# Patient Record
Sex: MALE | Race: WHITE | Employment: OTHER | URBAN - METROPOLITAN AREA
[De-identification: names, ages, dates, MRNs, and addresses within clinical notes are randomized per-mention and may not be internally consistent; named-entity substitution may affect disease eponyms.]

---

## 2017-01-06 ENCOUNTER — OFFICE VISIT (OUTPATIENT)
Dept: SURGERY | Facility: CLINIC | Age: 62
End: 2017-01-06

## 2017-01-06 DIAGNOSIS — Z87.19 S/P HEMORRHOIDECTOMY: Primary | ICD-10-CM

## 2017-01-06 DIAGNOSIS — Z98.890 S/P HEMORRHOIDECTOMY: Primary | ICD-10-CM

## 2017-01-06 PROCEDURE — 99213 OFFICE O/P EST LOW 20 MIN: CPT | Performed by: SURGERY

## 2017-01-06 PROCEDURE — 46600 DIAGNOSTIC ANOSCOPY SPX: CPT | Performed by: SURGERY

## 2017-01-08 NOTE — PROCEDURES
Patient in prone position. The anus and perineum were prepped and draped in the usual manner. Lidocaine 5% used as topical anesthesia. Anus was gently dilated and rectal digital exam performed .: no masses/pathology.    anoscope was advance in to the anal c

## 2017-01-08 NOTE — PATIENT INSTRUCTIONS
Anoscopy shows a well healed PPH Hemorrhoidectomy and RB of Hemorrhoid. Good rectal function. Patient will follow up with Primary care Physician. Call us as needed if there is a recurrence of the hemorrhoids issues.

## 2017-01-08 NOTE — PROGRESS NOTES
Follow Up Visit Note       Active Problems   S/p hemorrhoidectomy  (primary encounter diagnosis)  Chief Complaint: Patient presents with: Follow - Up: Patient here for follow up hemorrhoids. Last seen 07/08/2016, today is 6 month check up.  S/P Hemorrhoid METFORMIN  MG Oral Tab TAKE 2 TABLETS BY MOUTH TWICE DAILY WITH MEALS Disp: 360 tablet Rfl: 0   Losartan Potassium 50 MG Oral Tab Take 1 tablet (50 mg total) by mouth daily.  Disp: 90 tablet Rfl: 3   cetirizine 10 MG Oral Tab TAKE 1 TABLET(10 MG) B Rectum with well healed PPH hemorrhoidectomy . Musculoskeletal: Normal range of motion. He exhibits no edema or tenderness. Neurological: He is alert and oriented to person, place, and time. He has normal reflexes. Skin: Skin is warm and dry.  He is

## 2017-01-24 RX ORDER — PANTOPRAZOLE SODIUM 40 MG/1
TABLET, DELAYED RELEASE ORAL
Qty: 180 TABLET | Refills: 3 | Status: SHIPPED | OUTPATIENT
Start: 2017-01-24 | End: 2017-09-07

## 2017-01-24 NOTE — TELEPHONE ENCOUNTER
Pending Prescriptions Disp Refills    PANTOPRAZOLE SODIUM 40 MG Oral Tab EC [Pharmacy Med Name: PANTOPRAZOLE 40MG TABLETS] 180 tablet 0     Sig: TAKE ONE TABLET BY MOUTH TWICE DAILY BEFORE MEALS         See refill request  Patient last seen 12-8-16.    Me

## 2017-01-27 NOTE — TELEPHONE ENCOUNTER
please advise as does not meet RN protocol for refill criteria-labs      Cholesterol Medications  Protocol Criteria:  · Appointment scheduled in the past 12 months or in the next 3 months  · ALT & LDL on file in the past 12 months  · ALT result < 80  · LD

## 2017-01-29 RX ORDER — ATORVASTATIN CALCIUM 80 MG/1
TABLET, FILM COATED ORAL
Qty: 90 TABLET | Refills: 0 | Status: SHIPPED | OUTPATIENT
Start: 2017-01-29 | End: 2017-04-20

## 2017-02-06 ENCOUNTER — SURGERY (OUTPATIENT)
Age: 62
End: 2017-02-06

## 2017-02-07 ENCOUNTER — TELEPHONE (OUTPATIENT)
Dept: GASTROENTEROLOGY | Facility: CLINIC | Age: 62
End: 2017-02-07

## 2017-02-07 NOTE — TELEPHONE ENCOUNTER
Pt has questions about his 2/6/17 EGD. He wasn't able to speak to Gretta Linares after procedure. He would also like to know when he needs to be seen in office next. Please call.

## 2017-02-07 NOTE — TELEPHONE ENCOUNTER
NIRAVM for pt to call back to discuss his questions prior to sending to [address] [place] [address]. Dr Terry Sensor pt call back but phone lines down to receive calls for the remainder of the evening.

## 2017-02-08 NOTE — TELEPHONE ENCOUNTER
Spoke with pt and reviewed MD not in office this morning    He states he does not recall and his wife sometimes does not recall exactly what was said post procedure    He states he has been doing well on medication with no current symptoms.     Advised cont

## 2017-02-08 NOTE — TELEPHONE ENCOUNTER
Pt called in stating his pharmacy is requesting a new rx from Dr. Maritza Chance for his Invokana refill, please. Current outpatient prescriptions:     •  Canagliflozin (INVOKANA) 100 MG Oral Tab, Take 1 tablet by mouth daily. , Disp: 90 tablet, Rfl: 3

## 2017-02-09 NOTE — TELEPHONE ENCOUNTER
I discussed the findings of the patient's upper endoscopy (with him) retained food material in the stomach suggesting delayed gastric emptying. He is asymptomatic in this regard. There was no esophageal leukoplakia.   The patient also complains of urinary

## 2017-02-10 ENCOUNTER — PATIENT OUTREACH (OUTPATIENT)
Dept: INTERNAL MEDICINE CLINIC | Facility: CLINIC | Age: 62
End: 2017-02-10

## 2017-02-10 DIAGNOSIS — E11.9 TYPE 2 DIABETES MELLITUS WITHOUT COMPLICATION, WITHOUT LONG-TERM CURRENT USE OF INSULIN (HCC): Primary | ICD-10-CM

## 2017-02-10 DIAGNOSIS — Z12.5 PROSTATE CANCER SCREENING: ICD-10-CM

## 2017-02-10 NOTE — PROGRESS NOTES
Spoke with pt. Discussed that he is due for follow up labs to be completed. Pt agrees to do so. Instructed to fast prior to blood work. Pt states he also needs to schedule an appt with pcp but he is not able to do it at this time.  He will call back to sc

## 2017-02-11 NOTE — TELEPHONE ENCOUNTER
Please advise on refill request.  Unable to refill per protocol.     Diabetes Medications  Protocol Criteria:  · Appointment scheduled in the past 6 months or the next 3 months  · A1C < 7.5 in the past 6 months  · Creatinine in the past 12 months  · Creatin

## 2017-02-13 NOTE — PROGRESS NOTES
Pt called in requesting to speak to MILLY Nam. Pt states he didn't really have a good chance to talk to you and would like to speak with you today and re go over the plan. Please advise.

## 2017-02-13 NOTE — PROGRESS NOTES
Spoke with patient. He requested an appt with pcp to discuss urinary frequency that he has been having. Schedule for 2/21/17 at 12:50 pm.  Pt will pre lab prior to appt for diabetic labs. PLAN:  Follow up to review lab results.

## 2017-02-14 ENCOUNTER — APPOINTMENT (OUTPATIENT)
Dept: LAB | Facility: HOSPITAL | Age: 62
End: 2017-02-14
Attending: INTERNAL MEDICINE
Payer: COMMERCIAL

## 2017-02-14 DIAGNOSIS — E11.9 TYPE 2 DIABETES MELLITUS WITHOUT COMPLICATION, WITHOUT LONG-TERM CURRENT USE OF INSULIN (HCC): ICD-10-CM

## 2017-02-14 DIAGNOSIS — Z12.5 PROSTATE CANCER SCREENING: ICD-10-CM

## 2017-02-14 LAB
ALBUMIN SERPL BCP-MCNC: 4.8 G/DL (ref 3.5–4.8)
ALBUMIN/GLOB SERPL: 1.8 {RATIO} (ref 1–2)
ALP SERPL-CCNC: 53 U/L (ref 32–100)
ALT SERPL-CCNC: 47 U/L (ref 17–63)
ANION GAP SERPL CALC-SCNC: 10 MMOL/L (ref 0–18)
AST SERPL-CCNC: 37 U/L (ref 15–41)
BILIRUB SERPL-MCNC: 0.7 MG/DL (ref 0.3–1.2)
BUN SERPL-MCNC: 18 MG/DL (ref 8–20)
BUN/CREAT SERPL: 18.4 (ref 10–20)
CALCIUM SERPL-MCNC: 9.4 MG/DL (ref 8.5–10.5)
CHLORIDE SERPL-SCNC: 103 MMOL/L (ref 95–110)
CHOLEST SERPL-MCNC: 162 MG/DL (ref 110–200)
CO2 SERPL-SCNC: 26 MMOL/L (ref 22–32)
CREAT SERPL-MCNC: 0.98 MG/DL (ref 0.5–1.5)
CREAT UR-MCNC: 49.3 MG/DL
GLOBULIN PLAS-MCNC: 2.6 G/DL (ref 2.5–3.7)
GLUCOSE SERPL-MCNC: 114 MG/DL (ref 70–99)
HBA1C MFR BLD: 6.1 % (ref 4–6)
HDLC SERPL-MCNC: 26 MG/DL
LDLC SERPL CALC-MCNC: 80 MG/DL (ref 0–99)
MICROALBUMIN UR-MCNC: 2.1 MG/DL (ref 0–1.8)
MICROALBUMIN/CREAT UR: 42.6 MG/G{CREAT} (ref 0–20)
NONHDLC SERPL-MCNC: 136 MG/DL
OSMOLALITY UR CALC.SUM OF ELEC: 291 MOSM/KG (ref 275–295)
POTASSIUM SERPL-SCNC: 4.2 MMOL/L (ref 3.3–5.1)
PROT SERPL-MCNC: 7.4 G/DL (ref 5.9–8.4)
PSA SERPL-MCNC: 0.4 NG/ML (ref 0–4)
SODIUM SERPL-SCNC: 139 MMOL/L (ref 136–144)
TRIGL SERPL-MCNC: 280 MG/DL (ref 1–149)
TSH SERPL-ACNC: 0.96 UIU/ML (ref 0.34–5.6)

## 2017-02-14 PROCEDURE — 82570 ASSAY OF URINE CREATININE: CPT

## 2017-02-14 PROCEDURE — 36415 COLL VENOUS BLD VENIPUNCTURE: CPT

## 2017-02-14 PROCEDURE — 80061 LIPID PANEL: CPT

## 2017-02-14 PROCEDURE — 84443 ASSAY THYROID STIM HORMONE: CPT

## 2017-02-14 PROCEDURE — 83036 HEMOGLOBIN GLYCOSYLATED A1C: CPT

## 2017-02-14 PROCEDURE — 82043 UR ALBUMIN QUANTITATIVE: CPT

## 2017-02-14 PROCEDURE — 80053 COMPREHEN METABOLIC PANEL: CPT

## 2017-02-14 RX ORDER — DICYCLOMINE HYDROCHLORIDE 10 MG/1
CAPSULE ORAL
Qty: 120 CAPSULE | Refills: 11 | Status: SHIPPED | OUTPATIENT
Start: 2017-02-14 | End: 2017-07-27

## 2017-02-14 RX ORDER — GABAPENTIN 300 MG/1
CAPSULE ORAL
Qty: 180 CAPSULE | Refills: 0 | Status: SHIPPED | OUTPATIENT
Start: 2017-02-14 | End: 2017-05-09

## 2017-02-14 RX ORDER — FAMOTIDINE 40 MG/1
TABLET, FILM COATED ORAL
Qty: 90 TABLET | Refills: 0 | Status: SHIPPED | OUTPATIENT
Start: 2017-02-14 | End: 2017-05-28

## 2017-02-14 NOTE — TELEPHONE ENCOUNTER
Dr. Schmitt Patch see refill request for Dicyclomine. I phoned pt as it was LF: 11.14.16 (#120 R:2) and Rx is 4 times daily as needed. Pt states he typically takes 4 times daily, but at times down to 2 times. States he does need refill.    There is already on

## 2017-02-16 RX ORDER — BLOOD SUGAR DIAGNOSTIC
STRIP MISCELLANEOUS
Qty: 200 STRIP | Refills: 0 | Status: SHIPPED | OUTPATIENT
Start: 2017-02-16

## 2017-02-16 RX ORDER — LANCETS
EACH MISCELLANEOUS
Qty: 102 EACH | Refills: 2 | Status: SHIPPED | OUTPATIENT
Start: 2017-02-16

## 2017-02-21 ENCOUNTER — OFFICE VISIT (OUTPATIENT)
Dept: INTERNAL MEDICINE CLINIC | Facility: CLINIC | Age: 62
End: 2017-02-21

## 2017-02-21 VITALS
WEIGHT: 210 LBS | DIASTOLIC BLOOD PRESSURE: 86 MMHG | BODY MASS INDEX: 31.46 KG/M2 | HEART RATE: 84 BPM | SYSTOLIC BLOOD PRESSURE: 144 MMHG | HEIGHT: 68.5 IN

## 2017-02-21 DIAGNOSIS — E11.9 TYPE 2 DIABETES MELLITUS WITHOUT COMPLICATION, UNSPECIFIED LONG TERM INSULIN USE STATUS: ICD-10-CM

## 2017-02-21 DIAGNOSIS — I10 ESSENTIAL HYPERTENSION: ICD-10-CM

## 2017-02-21 DIAGNOSIS — K13.21 LEUKOPLAKIA OF ORAL CAVITY: ICD-10-CM

## 2017-02-21 DIAGNOSIS — N40.1 BENIGN NODULAR PROSTATIC HYPERPLASIA WITH LOWER URINARY TRACT SYMPTOMS: Primary | ICD-10-CM

## 2017-02-21 PROCEDURE — 99212 OFFICE O/P EST SF 10 MIN: CPT | Performed by: INTERNAL MEDICINE

## 2017-02-21 PROCEDURE — 99213 OFFICE O/P EST LOW 20 MIN: CPT | Performed by: INTERNAL MEDICINE

## 2017-02-21 RX ORDER — TERAZOSIN 5 MG/1
5 CAPSULE ORAL NIGHTLY
Qty: 30 CAPSULE | Refills: 3 | Status: SHIPPED | OUTPATIENT
Start: 2017-02-21 | End: 2017-06-02

## 2017-02-21 RX ORDER — FINASTERIDE 5 MG/1
5 TABLET, FILM COATED ORAL DAILY
Qty: 30 TABLET | Refills: 3 | Status: SHIPPED | OUTPATIENT
Start: 2017-02-21 | End: 2017-06-02

## 2017-02-21 NOTE — PROGRESS NOTES
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     HEENT-NC/AT PEERLA, eom intact, sclerae non icteric, oral exam wnl

## 2017-02-22 ENCOUNTER — TELEPHONE (OUTPATIENT)
Dept: INTERNAL MEDICINE CLINIC | Facility: CLINIC | Age: 62
End: 2017-02-22

## 2017-02-24 NOTE — TELEPHONE ENCOUNTER
PA for Icosapent Ethyl (VASCEPA) 1gm cap  completed with [employer] via CMM response time 3-5 business days Rafael Salazar.

## 2017-03-02 ENCOUNTER — TELEPHONE (OUTPATIENT)
Dept: INTERNAL MEDICINE CLINIC | Facility: CLINIC | Age: 62
End: 2017-03-02

## 2017-03-02 NOTE — TELEPHONE ENCOUNTER
Vascepa denied. Plan states use formulary alternatives Fenofibric acid, Fenofibrate and Gemfibrozil.

## 2017-03-21 ENCOUNTER — OFFICE VISIT (OUTPATIENT)
Dept: INTERNAL MEDICINE CLINIC | Facility: CLINIC | Age: 62
End: 2017-03-21

## 2017-03-21 VITALS
DIASTOLIC BLOOD PRESSURE: 77 MMHG | BODY MASS INDEX: 32.81 KG/M2 | HEIGHT: 68.5 IN | WEIGHT: 219 LBS | SYSTOLIC BLOOD PRESSURE: 136 MMHG | HEART RATE: 74 BPM

## 2017-03-21 DIAGNOSIS — E11.9 TYPE 2 DIABETES MELLITUS WITHOUT COMPLICATION, UNSPECIFIED LONG TERM INSULIN USE STATUS: ICD-10-CM

## 2017-03-21 DIAGNOSIS — R35.0 URINARY FREQUENCY: Primary | ICD-10-CM

## 2017-03-21 PROCEDURE — 99212 OFFICE O/P EST SF 10 MIN: CPT | Performed by: INTERNAL MEDICINE

## 2017-03-21 PROCEDURE — 99213 OFFICE O/P EST LOW 20 MIN: CPT | Performed by: INTERNAL MEDICINE

## 2017-03-21 NOTE — PROGRESS NOTES
Still urinating frequently  Blood sugars are still in the  range without invokana  Blood pressure 136/77, pulse 74, height 5' 8.5\" (1.74 m), weight 219 lb (99.338 kg).     HEENT-NC/AT PEERLA, eom intact, sclerae non icteric, oral exam wnl, ears nl tm

## 2017-04-12 ENCOUNTER — OFFICE VISIT (OUTPATIENT)
Dept: SURGERY | Facility: CLINIC | Age: 62
End: 2017-04-12

## 2017-04-12 VITALS
DIASTOLIC BLOOD PRESSURE: 66 MMHG | HEIGHT: 69 IN | SYSTOLIC BLOOD PRESSURE: 130 MMHG | BODY MASS INDEX: 29.62 KG/M2 | RESPIRATION RATE: 16 BRPM | WEIGHT: 200 LBS | HEART RATE: 84 BPM | TEMPERATURE: 98 F

## 2017-04-12 DIAGNOSIS — R82.90 URINE FINDING: ICD-10-CM

## 2017-04-12 DIAGNOSIS — R35.0 URINARY FREQUENCY: Primary | ICD-10-CM

## 2017-04-12 DIAGNOSIS — R82.90 URINE ABNORMALITY: ICD-10-CM

## 2017-04-12 PROCEDURE — 99213 OFFICE O/P EST LOW 20 MIN: CPT | Performed by: UROLOGY

## 2017-04-12 PROCEDURE — 99244 OFF/OP CNSLTJ NEW/EST MOD 40: CPT | Performed by: UROLOGY

## 2017-04-12 PROCEDURE — 81003 URINALYSIS AUTO W/O SCOPE: CPT | Performed by: UROLOGY

## 2017-04-12 NOTE — PROGRESS NOTES
SUBJECTIVE:  Agustina Guevara is a [address] old male who presents for a consultation at the request of, and a copy of this note will be sent to, Dr. Jamie Newman, for evaluation of  benign prostatic hyperplasia and urinary frquency.  He states that the problem i apnea    • High cholesterol    • Osteoarthritis    • History of stomach ulcers           Past Surgical History    HEMORRHOIDECTOMY  06/24/14    Comment Hemorrhoidectomy and rectal mucosqal proctopexy for correction of a significant rectal mucosal prolapse discomfort, dizziness or fainting, palpitations, leg swelling, nocturia, or claudication. GASTROINTESTINAL:  Negative for nausea, vomiting, diarrhea, constipation, heartburn or indigestion, abdominal pains, bloody or tarry stools.   GENERAL: Denies:  weigh

## 2017-04-21 RX ORDER — ATORVASTATIN CALCIUM 80 MG/1
TABLET, FILM COATED ORAL
Qty: 90 TABLET | Refills: 3 | Status: SHIPPED | OUTPATIENT
Start: 2017-04-21 | End: 2017-07-13

## 2017-05-03 ENCOUNTER — TELEPHONE (OUTPATIENT)
Dept: GASTROENTEROLOGY | Facility: CLINIC | Age: 62
End: 2017-05-03

## 2017-05-03 DIAGNOSIS — K75.81 NONALCOHOLIC STEATOHEPATITIS (NASH): Primary | ICD-10-CM

## 2017-05-10 RX ORDER — GABAPENTIN 300 MG/1
CAPSULE ORAL
Qty: 180 CAPSULE | Refills: 11 | Status: SHIPPED | OUTPATIENT
Start: 2017-05-10 | End: 2017-05-15

## 2017-05-11 ENCOUNTER — OFFICE VISIT (OUTPATIENT)
Dept: SURGERY | Facility: CLINIC | Age: 62
End: 2017-05-11

## 2017-05-11 DIAGNOSIS — R35.0 URINARY FREQUENCY: Primary | ICD-10-CM

## 2017-05-11 PROCEDURE — 51798 US URINE CAPACITY MEASURE: CPT | Performed by: UROLOGY

## 2017-05-11 PROCEDURE — 51741 ELECTRO-UROFLOWMETRY FIRST: CPT | Performed by: UROLOGY

## 2017-05-11 PROCEDURE — 99213 OFFICE O/P EST LOW 20 MIN: CPT | Performed by: UROLOGY

## 2017-05-11 RX ORDER — FENOFIBRATE 160 MG/1
TABLET ORAL
Qty: 90 TABLET | Refills: 0 | Status: SHIPPED | OUTPATIENT
Start: 2017-05-11 | End: 2017-07-27

## 2017-05-11 RX ORDER — OXYBUTYNIN CHLORIDE 5 MG/1
5 TABLET, EXTENDED RELEASE ORAL DAILY
Qty: 30 TABLET | Refills: 11 | Status: SHIPPED | OUTPATIENT
Start: 2017-05-11 | End: 2017-07-27

## 2017-05-11 NOTE — TELEPHONE ENCOUNTER
Hypertensive Medications  Protocol Criteria:  · Appointment scheduled in the past 6 months or in the next 3 months  · BMP or CMP in the past 12 months  · Creatinine result < 2      LAST OV 3.2017    Future Appointments       Provider Department Appt Notes

## 2017-05-11 NOTE — PROGRESS NOTES
Jim Sorensen is a 63year old male. HPI:   Patient presents with:  Urinary Symptoms (urologic): uroflow     63-year-old male presents for uroflow bladder ultrasound referred for evaluation of urinary frequency urgency and nocturia ×2-3.   He has been Onset   • Heart Surgery Father      CABG   • Diabetes Father 64     type 1 (cause of death)   • Hypertension Father    • Lipids Father      hyperlipidemia   • Heart Disease Father      CAD   • Diabetes Mother    • Hypertension Mother    • Lipids Mother BY MOUTH TWICE DAILY BEFORE MEALS Disp: 180 tablet Rfl: 3   METFORMIN  MG Oral Tab TAKE 2 TABLETS BY MOUTH TWICE DAILY WITH MEALS Disp: 360 tablet Rfl: 0   Losartan Potassium 50 MG Oral Tab Take 1 tablet (50 mg total) by mouth daily.  Disp: 90 tablet provided. I asked the patient to follow-up in 4 weeks to assess clinical improvement. Patient understands plan and agrees and will follow up accordingly. Orders This Visit:  No orders of the defined types were placed in this encounter.

## 2017-05-15 RX ORDER — GABAPENTIN 300 MG/1
CAPSULE ORAL
Qty: 180 CAPSULE | Refills: 11 | Status: SHIPPED | OUTPATIENT
Start: 2017-05-15 | End: 2018-05-25

## 2017-05-16 ENCOUNTER — TELEPHONE (OUTPATIENT)
Dept: INTERNAL MEDICINE CLINIC | Facility: CLINIC | Age: 62
End: 2017-05-16

## 2017-05-16 NOTE — TELEPHONE ENCOUNTER
Pt calling in for a refill rx.  for med below  Pt has about a weeks worth of meds left    cetirizine 10 MG Oral Tab 90 tablet 3 5/23/2016       Sig :  TAKE 1 TABLET(10 MG) BY MOUTH NIGHTLY       Route:   (none)       Order #:   [id number]

## 2017-05-19 RX ORDER — CETIRIZINE HYDROCHLORIDE 10 MG/1
TABLET ORAL
Qty: 90 TABLET | Refills: 0 | Status: SHIPPED | OUTPATIENT
Start: 2017-05-19 | End: 2017-08-11

## 2017-05-29 RX ORDER — FAMOTIDINE 40 MG/1
TABLET, FILM COATED ORAL
Qty: 90 TABLET | Refills: 0 | Status: SHIPPED | OUTPATIENT
Start: 2017-05-29 | End: 2017-08-25

## 2017-05-30 NOTE — TELEPHONE ENCOUNTER
Chart reviewed. Refills sent per Triage Dept protocol.    Refill Protocol Appointment Criteria  · Appointment scheduled in the past 12 months or in the next 3 months  Recent Visits       Provider Department Primary Dx    2 months ago Marva Solomon MD E

## 2017-06-01 ENCOUNTER — HOSPITAL ENCOUNTER (OUTPATIENT)
Dept: ULTRASOUND IMAGING | Facility: HOSPITAL | Age: 62
Discharge: HOME | End: 2017-06-01
Attending: INTERNAL MEDICINE
Payer: COMMERCIAL

## 2017-06-01 DIAGNOSIS — K75.81 NONALCOHOLIC STEATOHEPATITIS (NASH): ICD-10-CM

## 2017-06-01 PROCEDURE — 76705 ECHO EXAM OF ABDOMEN: CPT | Performed by: INTERNAL MEDICINE

## 2017-06-03 RX ORDER — FINASTERIDE 5 MG/1
TABLET, FILM COATED ORAL
Qty: 30 TABLET | Refills: 11 | Status: SHIPPED | OUTPATIENT
Start: 2017-06-03 | End: 2017-07-13

## 2017-06-03 RX ORDER — TERAZOSIN 5 MG/1
CAPSULE ORAL
Qty: 30 CAPSULE | Refills: 11 | Status: SHIPPED | OUTPATIENT
Start: 2017-06-03 | End: 2017-07-13

## 2017-06-15 ENCOUNTER — TELEPHONE (OUTPATIENT)
Dept: SURGERY | Facility: CLINIC | Age: 62
End: 2017-06-15

## 2017-06-15 ENCOUNTER — OFFICE VISIT (OUTPATIENT)
Dept: SURGERY | Facility: CLINIC | Age: 62
End: 2017-06-15

## 2017-06-15 VITALS
SYSTOLIC BLOOD PRESSURE: 146 MMHG | HEART RATE: 75 BPM | TEMPERATURE: 98 F | WEIGHT: 205 LBS | DIASTOLIC BLOOD PRESSURE: 83 MMHG | BODY MASS INDEX: 28.7 KG/M2 | HEIGHT: 70.8 IN

## 2017-06-15 DIAGNOSIS — R35.0 URINARY FREQUENCY: Primary | ICD-10-CM

## 2017-06-15 PROCEDURE — 99212 OFFICE O/P EST SF 10 MIN: CPT | Performed by: UROLOGY

## 2017-06-15 PROCEDURE — 99213 OFFICE O/P EST LOW 20 MIN: CPT | Performed by: UROLOGY

## 2017-06-15 NOTE — PROGRESS NOTES
Dimitry Mazariegos is a 63year old male.     HPI:   Patient presents with:  Urinary Symptoms (urologic): frequency every half hour during the day no nocturia denies hematuria or dysuria   Incontinence: patient wearing incontinence pads       72-year-old male Right 2/3/2011    UPPER GI ENDOSCOPY,EXAM  2011    Comment EGD with biopsy    COLONOSCOPY  2010,1011    CARPAL TUNNEL RELEASE Right 6/3/2014    KNEE ARTHROSCOPY Right [id number]    T&A  1994    REPAIR OF NASAL SEPTUM  1994    Comment nasal septoplasty 160 MG Oral Tab TAKE 1 TABLET(160 MG) BY MOUTH EVERY DAY Disp: 90 tablet Rfl: 0   Oxybutynin Chloride ER 5 MG Oral Tablet 24 Hr Take 1 tablet (5 mg total) by mouth daily.  Disp: 30 tablet Rfl: 11   ATORVASTATIN CALCIUM 80 MG Oral Tab TAKE 1 TABLET BY MOUTH ASSESSMENT/PLAN:   Assessment  Urinary frequency  (primary encounter diagnosis)    Reviewed findings at length with patient. Recommended further evaluation with urodynamic studies to rule out diabetes mediated changes in his bladder function.   The patie

## 2017-06-28 ENCOUNTER — OFFICE VISIT (OUTPATIENT)
Dept: SURGERY | Facility: CLINIC | Age: 62
End: 2017-06-28

## 2017-06-28 DIAGNOSIS — R35.0 URINARY FREQUENCY: Primary | ICD-10-CM

## 2017-06-28 DIAGNOSIS — R39.9 SYMPTOM INVOLVING BLADDER: ICD-10-CM

## 2017-06-28 DIAGNOSIS — Z12.5 SPECIAL SCREENING, PROSTATE CANCER: ICD-10-CM

## 2017-06-28 DIAGNOSIS — Z01.818 PREOP EXAMINATION: ICD-10-CM

## 2017-06-28 PROCEDURE — 99214 OFFICE O/P EST MOD 30 MIN: CPT | Performed by: UROLOGY

## 2017-06-28 PROCEDURE — 51784 ANAL/URINARY MUSCLE STUDY: CPT | Performed by: UROLOGY

## 2017-06-28 PROCEDURE — 51728 CYSTOMETROGRAM W/VP: CPT | Performed by: UROLOGY

## 2017-06-28 PROCEDURE — 51797 INTRAABDOMINAL PRESSURE TEST: CPT | Performed by: UROLOGY

## 2017-06-28 NOTE — PROGRESS NOTES
Sharath Vazquez is a 63year old male. HPI:   No chief complaint on file. 53-year-old male presents for urodynamic testing and follow-up to a previous visits Thuy 15, 2017.   The patient has BPH, lower urinary tract symptoms refractory to medical the Mother    • Lipids Mother      hyperlipidemia   • Thyroid Disorder Mother      Thyroid disease   • Stroke Mother    • Diabetes Sister    • Stroke Brother      CVA   • Diabetes Brother    • Hypertension Brother    • Diabetes Other      type 1, fam hx   • Gl tablet Rfl: 0   Losartan Potassium 50 MG Oral Tab Take 1 tablet (50 mg total) by mouth daily. Disp: 90 tablet Rfl: 3   Dermatological Products, Misc.  (RESINOL) 55-2 % Apply Externally Ointment Apply to rectal area as directed per MD. Disp: 1 each Rfl: 3 frequency  (primary encounter diagnosis)    Reviewed all issues with patient. Recommended cystoscopy and greenlight laser vaporization of the prostate.   Discussed with him events of the [facility] today and told the patient that if a urethral stricture or bladde

## 2017-06-29 ENCOUNTER — TELEPHONE (OUTPATIENT)
Dept: SURGERY | Facility: CLINIC | Age: 62
End: 2017-06-29

## 2017-06-29 DIAGNOSIS — N40.1 HYPERPLASIA OF PROSTATE WITH LOWER URINARY TRACT SYMPTOMS (LUTS): Primary | ICD-10-CM

## 2017-06-29 NOTE — TELEPHONE ENCOUNTER
Patient seen in office scheduled for cysto, green light laser on 07/11/17 @ 2:00 at [facility]/outpatient

## 2017-06-30 NOTE — TELEPHONE ENCOUNTER
Pt is having Prostate surgery on 07/11/17 @ [facility]. He will have his blood drawn with his pre op labs. Reminded pt he needs to be fasting.  He verbalizes understanding

## 2017-07-01 ENCOUNTER — APPOINTMENT (OUTPATIENT)
Dept: LAB | Facility: HOSPITAL | Age: 62
End: 2017-07-01
Attending: INTERNAL MEDICINE
Payer: COMMERCIAL

## 2017-07-01 DIAGNOSIS — Z12.5 SPECIAL SCREENING, PROSTATE CANCER: ICD-10-CM

## 2017-07-01 DIAGNOSIS — K75.81 NONALCOHOLIC STEATOHEPATITIS (NASH): ICD-10-CM

## 2017-07-01 DIAGNOSIS — Z01.818 PREOP EXAMINATION: ICD-10-CM

## 2017-07-01 DIAGNOSIS — R39.9 SYMPTOM INVOLVING BLADDER: ICD-10-CM

## 2017-07-01 LAB
ALBUMIN SERPL BCP-MCNC: 4.5 G/DL (ref 3.5–4.8)
ALBUMIN/GLOB SERPL: 2 {RATIO} (ref 1–2)
ALP SERPL-CCNC: 56 U/L (ref 32–100)
ALT SERPL-CCNC: 68 U/L (ref 17–63)
ANION GAP SERPL CALC-SCNC: 10 MMOL/L (ref 0–18)
AST SERPL-CCNC: 50 U/L (ref 15–41)
BASOPHILS # BLD: 0 K/UL (ref 0–0.2)
BASOPHILS NFR BLD: 1 %
BILIRUB SERPL-MCNC: 0.5 MG/DL (ref 0.3–1.2)
BUN SERPL-MCNC: 19 MG/DL (ref 8–20)
BUN/CREAT SERPL: 17.3 (ref 10–20)
CALCIUM SERPL-MCNC: 9.1 MG/DL (ref 8.5–10.5)
CHLORIDE SERPL-SCNC: 107 MMOL/L (ref 95–110)
CO2 SERPL-SCNC: 25 MMOL/L (ref 22–32)
CREAT SERPL-MCNC: 1.1 MG/DL (ref 0.5–1.5)
EOSINOPHIL # BLD: 0.1 K/UL (ref 0–0.7)
EOSINOPHIL NFR BLD: 3 %
ERYTHROCYTE [DISTWIDTH] IN BLOOD BY AUTOMATED COUNT: 13.6 % (ref 11–15)
GLOBULIN PLAS-MCNC: 2.3 G/DL (ref 2.5–3.7)
GLUCOSE SERPL-MCNC: 132 MG/DL (ref 70–99)
HCT VFR BLD AUTO: 40.4 % (ref 41–52)
HGB BLD-MCNC: 14.2 G/DL (ref 13.5–17.5)
INR BLD: 1 (ref 0.9–1.2)
LYMPHOCYTES # BLD: 1.2 K/UL (ref 1–4)
LYMPHOCYTES NFR BLD: 30 %
MCH RBC QN AUTO: 32.5 PG (ref 27–32)
MCHC RBC AUTO-ENTMCNC: 35.1 G/DL (ref 32–37)
MCV RBC AUTO: 92.6 FL (ref 80–100)
MONOCYTES # BLD: 0.5 K/UL (ref 0–1)
MONOCYTES NFR BLD: 13 %
NEUTROPHILS # BLD AUTO: 2.2 K/UL (ref 1.8–7.7)
NEUTROPHILS NFR BLD: 54 %
OSMOLALITY UR CALC.SUM OF ELEC: 298 MOSM/KG (ref 275–295)
PLATELET # BLD AUTO: 156 K/UL (ref 140–400)
PMV BLD AUTO: 9.6 FL (ref 7.4–10.3)
POTASSIUM SERPL-SCNC: 4 MMOL/L (ref 3.3–5.1)
PROT SERPL-MCNC: 6.8 G/DL (ref 5.9–8.4)
PROTHROMBIN TIME: 13 SECONDS (ref 11.8–14.5)
PSA SERPL-MCNC: 0.2 NG/ML (ref 0–4)
RBC # BLD AUTO: 4.36 M/UL (ref 4.5–5.9)
SODIUM SERPL-SCNC: 142 MMOL/L (ref 136–144)
WBC # BLD AUTO: 4 K/UL (ref 4–11)

## 2017-07-01 PROCEDURE — 93005 ELECTROCARDIOGRAM TRACING: CPT

## 2017-07-01 PROCEDURE — 36415 COLL VENOUS BLD VENIPUNCTURE: CPT

## 2017-07-01 PROCEDURE — 80053 COMPREHEN METABOLIC PANEL: CPT

## 2017-07-01 PROCEDURE — 85610 PROTHROMBIN TIME: CPT

## 2017-07-01 PROCEDURE — 82105 ALPHA-FETOPROTEIN SERUM: CPT

## 2017-07-01 PROCEDURE — 87086 URINE CULTURE/COLONY COUNT: CPT

## 2017-07-01 PROCEDURE — 93010 ELECTROCARDIOGRAM REPORT: CPT | Performed by: UROLOGY

## 2017-07-01 PROCEDURE — 85025 COMPLETE CBC W/AUTO DIFF WBC: CPT | Performed by: INTERNAL MEDICINE

## 2017-07-03 LAB — AFP-TM SERPL-MCNC: 5.4 NG/ML (ref 0–8.9)

## 2017-07-06 ENCOUNTER — TELEPHONE (OUTPATIENT)
Dept: SURGERY | Facility: CLINIC | Age: 62
End: 2017-07-06

## 2017-07-06 RX ORDER — GABAPENTIN 600 MG/1
600 TABLET ORAL NIGHTLY
COMMUNITY
End: 2017-07-13

## 2017-07-06 RX ORDER — MAG HYDROX/ALUMINUM HYD/SIMETH 400-400-40
SUSPENSION, ORAL (FINAL DOSE FORM) ORAL
COMMUNITY

## 2017-07-06 NOTE — TELEPHONE ENCOUNTER
Dear Laura March, per [facility]' request, your patient is scheduled for Cystoscopy, with Green light Laser Vaproization on Tuesday 07/11/17 and [facility] is asking that patient be medically cleared for the procedure.   Any questions please contact [facility]

## 2017-07-11 ENCOUNTER — SURGERY (OUTPATIENT)
Age: 62
End: 2017-07-11

## 2017-07-11 ENCOUNTER — HOSPITAL ENCOUNTER (OUTPATIENT)
Facility: HOSPITAL | Age: 62
Setting detail: HOSPITAL OUTPATIENT SURGERY
Discharge: HOME | End: 2017-07-11
Attending: UROLOGY | Admitting: UROLOGY
Payer: COMMERCIAL

## 2017-07-11 ENCOUNTER — ANESTHESIA EVENT (OUTPATIENT)
Dept: SURGERY | Facility: HOSPITAL | Age: 62
End: 2017-07-11
Payer: COMMERCIAL

## 2017-07-11 ENCOUNTER — ANESTHESIA (OUTPATIENT)
Dept: SURGERY | Facility: HOSPITAL | Age: 62
End: 2017-07-11
Payer: COMMERCIAL

## 2017-07-11 VITALS
OXYGEN SATURATION: 95 % | TEMPERATURE: 97 F | RESPIRATION RATE: 16 BRPM | BODY MASS INDEX: 30.81 KG/M2 | HEIGHT: 69 IN | DIASTOLIC BLOOD PRESSURE: 80 MMHG | HEART RATE: 64 BPM | WEIGHT: 208 LBS | SYSTOLIC BLOOD PRESSURE: 133 MMHG

## 2017-07-11 LAB — GLUCOSE BLDC GLUCOMTR-MCNC: 106 MG/DL (ref 70–99)

## 2017-07-11 PROCEDURE — 0V508ZZ DESTRUCTION OF PROSTATE, VIA NATURAL OR ARTIFICIAL OPENING ENDOSCOPIC: ICD-10-PCS | Performed by: UROLOGY

## 2017-07-11 PROCEDURE — 52648 LASER SURGERY OF PROSTATE: CPT | Performed by: UROLOGY

## 2017-07-11 RX ORDER — NALOXONE HYDROCHLORIDE 0.4 MG/ML
80 INJECTION, SOLUTION INTRAMUSCULAR; INTRAVENOUS; SUBCUTANEOUS AS NEEDED
Status: DISCONTINUED | OUTPATIENT
Start: 2017-07-11 | End: 2017-07-11

## 2017-07-11 RX ORDER — HYDROCODONE BITARTRATE AND ACETAMINOPHEN 5; 325 MG/1; MG/1
2 TABLET ORAL AS NEEDED
Status: COMPLETED | OUTPATIENT
Start: 2017-07-11 | End: 2017-07-11

## 2017-07-11 RX ORDER — HYDROMORPHONE HYDROCHLORIDE 1 MG/ML
0.4 INJECTION, SOLUTION INTRAMUSCULAR; INTRAVENOUS; SUBCUTANEOUS EVERY 5 MIN PRN
Status: DISCONTINUED | OUTPATIENT
Start: 2017-07-11 | End: 2017-07-11

## 2017-07-11 RX ORDER — LIDOCAINE HYDROCHLORIDE 10 MG/ML
INJECTION, SOLUTION EPIDURAL; INFILTRATION; INTRACAUDAL; PERINEURAL AS NEEDED
Status: DISCONTINUED | OUTPATIENT
Start: 2017-07-11 | End: 2017-07-11 | Stop reason: SURG

## 2017-07-11 RX ORDER — MORPHINE SULFATE 4 MG/ML
6 INJECTION, SOLUTION INTRAMUSCULAR; INTRAVENOUS EVERY 10 MIN PRN
Status: DISCONTINUED | OUTPATIENT
Start: 2017-07-11 | End: 2017-07-11

## 2017-07-11 RX ORDER — HYDROMORPHONE HYDROCHLORIDE 1 MG/ML
0.6 INJECTION, SOLUTION INTRAMUSCULAR; INTRAVENOUS; SUBCUTANEOUS EVERY 5 MIN PRN
Status: DISCONTINUED | OUTPATIENT
Start: 2017-07-11 | End: 2017-07-11

## 2017-07-11 RX ORDER — HYDROMORPHONE HYDROCHLORIDE 1 MG/ML
0.2 INJECTION, SOLUTION INTRAMUSCULAR; INTRAVENOUS; SUBCUTANEOUS EVERY 5 MIN PRN
Status: DISCONTINUED | OUTPATIENT
Start: 2017-07-11 | End: 2017-07-11

## 2017-07-11 RX ORDER — SODIUM CHLORIDE, SODIUM LACTATE, POTASSIUM CHLORIDE, CALCIUM CHLORIDE 600; 310; 30; 20 MG/100ML; MG/100ML; MG/100ML; MG/100ML
INJECTION, SOLUTION INTRAVENOUS CONTINUOUS PRN
Status: DISCONTINUED | OUTPATIENT
Start: 2017-07-11 | End: 2017-07-11 | Stop reason: SURG

## 2017-07-11 RX ORDER — FAMOTIDINE 20 MG/1
20 TABLET ORAL ONCE
Status: DISCONTINUED | OUTPATIENT
Start: 2017-07-11 | End: 2017-07-11 | Stop reason: HOSPADM

## 2017-07-11 RX ORDER — MIDAZOLAM HYDROCHLORIDE 1 MG/ML
INJECTION INTRAMUSCULAR; INTRAVENOUS AS NEEDED
Status: DISCONTINUED | OUTPATIENT
Start: 2017-07-11 | End: 2017-07-11 | Stop reason: SURG

## 2017-07-11 RX ORDER — MORPHINE SULFATE 4 MG/ML
4 INJECTION, SOLUTION INTRAMUSCULAR; INTRAVENOUS EVERY 10 MIN PRN
Status: DISCONTINUED | OUTPATIENT
Start: 2017-07-11 | End: 2017-07-11

## 2017-07-11 RX ORDER — SODIUM CHLORIDE, SODIUM LACTATE, POTASSIUM CHLORIDE, CALCIUM CHLORIDE 600; 310; 30; 20 MG/100ML; MG/100ML; MG/100ML; MG/100ML
INJECTION, SOLUTION INTRAVENOUS CONTINUOUS
Status: DISCONTINUED | OUTPATIENT
Start: 2017-07-11 | End: 2017-07-11

## 2017-07-11 RX ORDER — METOCLOPRAMIDE 10 MG/1
10 TABLET ORAL ONCE
Status: COMPLETED | OUTPATIENT
Start: 2017-07-11 | End: 2017-07-11

## 2017-07-11 RX ORDER — ACETAMINOPHEN 325 MG/1
650 TABLET ORAL ONCE
Status: COMPLETED | OUTPATIENT
Start: 2017-07-11 | End: 2017-07-11

## 2017-07-11 RX ORDER — HYDROCODONE BITARTRATE AND ACETAMINOPHEN 5; 325 MG/1; MG/1
1 TABLET ORAL AS NEEDED
Status: COMPLETED | OUTPATIENT
Start: 2017-07-11 | End: 2017-07-11

## 2017-07-11 RX ORDER — MORPHINE SULFATE 2 MG/ML
2 INJECTION, SOLUTION INTRAMUSCULAR; INTRAVENOUS EVERY 10 MIN PRN
Status: DISCONTINUED | OUTPATIENT
Start: 2017-07-11 | End: 2017-07-11

## 2017-07-11 RX ORDER — CIPROFLOXACIN 500 MG/1
500 TABLET, FILM COATED ORAL 2 TIMES DAILY
Qty: 6 TABLET | Refills: 0 | Status: SHIPPED | OUTPATIENT
Start: 2017-07-12 | End: 2017-07-27

## 2017-07-11 RX ORDER — ONDANSETRON 2 MG/ML
4 INJECTION INTRAMUSCULAR; INTRAVENOUS ONCE AS NEEDED
Status: DISCONTINUED | OUTPATIENT
Start: 2017-07-11 | End: 2017-07-11

## 2017-07-11 RX ORDER — LEVOFLOXACIN 5 MG/ML
500 INJECTION, SOLUTION INTRAVENOUS ONCE
Status: COMPLETED | OUTPATIENT
Start: 2017-07-11 | End: 2017-07-11

## 2017-07-11 RX ADMIN — SODIUM CHLORIDE, SODIUM LACTATE, POTASSIUM CHLORIDE, CALCIUM CHLORIDE: 600; 310; 30; 20 INJECTION, SOLUTION INTRAVENOUS at 15:34:00

## 2017-07-11 RX ADMIN — LEVOFLOXACIN 500 MG: 5 INJECTION, SOLUTION INTRAVENOUS at 14:47:00

## 2017-07-11 RX ADMIN — SODIUM CHLORIDE, SODIUM LACTATE, POTASSIUM CHLORIDE, CALCIUM CHLORIDE: 600; 310; 30; 20 INJECTION, SOLUTION INTRAVENOUS at 14:40:00

## 2017-07-11 RX ADMIN — LIDOCAINE HYDROCHLORIDE 50 MG: 10 INJECTION, SOLUTION EPIDURAL; INFILTRATION; INTRACAUDAL; PERINEURAL at 14:47:00

## 2017-07-11 RX ADMIN — MIDAZOLAM HYDROCHLORIDE 2 MG: 1 INJECTION INTRAMUSCULAR; INTRAVENOUS at 14:44:00

## 2017-07-11 NOTE — ANESTHESIA POSTPROCEDURE EVALUATION
Patient: Donna Schulz    Procedure Summary     Date:  07/11/17 Room / Location:  [address] [place] [address] OR 14 / [address] [place] [address] OR    Anesthesia Start:  3000 Anesthesia Stop:      Procedures:       CYSTOSCOPY (N/A Bladder)      GREEN LIGHT LASER OF THE PROSTATE (N/A ) Diag

## 2017-07-11 NOTE — ANESTHESIA PREPROCEDURE EVALUATION
Anesthesia PreOp Note    HPI:     Kaylah Boggs is a 63year old male who presents for preoperative consultation requested by:  Trinh Munoz MD    Date of Surgery: 7/11/2017    Procedure(s):  CYSTOSCOPY  GREEN LIGHT LASER OF THE PROSTATE  Indication: Hyperlipidemia    • Obesity    • Osteoarthritis    • Right rotator cuff tear 2/3/2011   • Sleep apnea     uses cpap machine   • Tonsillitis 1994    nasal septoplasty, t&A   • Umbilical hernia 3084       Past Surgical History:  6/3/2014: Brody Galeano EVERY NIGHT AT BEDTIME Disp: 90 tablet Rfl: 3 7/10/2017 at Unknown time   DICYCLOMINE HCL 10 MG Oral Cap TAKE 1 CAPSULE BY MOUTH FOUR TIMES DAILY AS NEEDED (Patient taking differently: TAKE 1 CAPSULE BY MOUTH twice a day) Disp: 120 capsule Rfl: 11 7/10/201 Disp:  Rfl:  Taking       Current Facility-Administered Medications Ordered in Epic:  lactated ringers infusion  Intravenous Continuous Delores Chen MD Last Rate: 20 mL/hr at 07/11/17 1404   famoTIDine (PEPCID) tab 20 mg 20 mg Oral Once Delores Chen, Results  Component Value Date   WBC 4.0 07/01/2017   RBC 4.36 (L) 07/01/2017   HGB 14.2 07/01/2017   HCT 40.4 (L) 07/01/2017   MCV 92.6 07/01/2017   MCH 32.5 (H) 07/01/2017   MCHC 35.1 07/01/2017   RDW 13.6 07/01/2017    07/01/2017   MPV 9.6 07/01/2 planned.   Sabine Montoya  7/11/2017 2:23 PM

## 2017-07-11 NOTE — INTERVAL H&P NOTE
Pre-op Diagnosis: benign prostatic hyperplasia    The above referenced H&P was reviewed by Adonis Hernández MD on 7/11/2017, the patient was examined and no significant changes have occurred in the patient's condition since the H&P was performed.   I discusse

## 2017-07-11 NOTE — H&P
62-year-old male presents for urodynamic testing and follow-up to a previous visits Thuy 15, 2017. The patient has BPH, lower urinary tract symptoms refractory to medical therapy with finasteride and terazosin.   He is diabetic and has a history of cirrhos hyperlipidemia   • Thyroid Disorder Mother         Thyroid disease   • Stroke Mother     • Diabetes Sister     • Stroke Brother         CVA   • Diabetes Brother     • Hypertension Brother     • Diabetes Other         type 1, fam hx   • Glaucoma Neg     • R Losartan Potassium 50 MG Oral Tab Take 1 tablet (50 mg total) by mouth daily. Disp: 90 tablet Rfl: 3   Dermatological Products, Misc.  (RESINOL) 55-2 % Apply Externally Ointment Apply to rectal area as directed per MD. Disp: 1 each Rfl: 3   Omega-3-acid E examination  Special screening, prostate cancer  Symptom involving bladder  Urinary frequency  (primary encounter diagnosis)     Reviewed all issues with patient. Recommended cystoscopy and greenlight laser vaporization of the prostate.   Discussed with hi

## 2017-07-11 NOTE — OPERATIVE REPORT
[facility]    Operative Note     Deepak Garza Location: OR   [facility] [id number] MRN [id number]   Admission Date 7/11/2017 Operation Date 7/11/2017   Attending Physician Ignacia June MD Operating Physician Darleen Kanner, MD      Preoperati that location where I left off back to the level of the verumontanum. This process took approximately 30 minutes. At the completion of the procedure there was a nice open channel from the bladder neck to the verumontanum.   I reinspected the ureteral orif

## 2017-07-12 ENCOUNTER — TELEPHONE (OUTPATIENT)
Dept: SURGERY | Facility: CLINIC | Age: 62
End: 2017-07-12

## 2017-07-12 NOTE — TELEPHONE ENCOUNTER
Pt had sx on 7/11, states he was told to schedule appt in 2 weeks, KATERINA found no openings until 8/8. Pls advise thank you.

## 2017-07-13 ENCOUNTER — OFFICE VISIT (OUTPATIENT)
Dept: INTERNAL MEDICINE CLINIC | Facility: CLINIC | Age: 62
End: 2017-07-13

## 2017-07-13 VITALS
DIASTOLIC BLOOD PRESSURE: 70 MMHG | HEART RATE: 74 BPM | HEIGHT: 70 IN | BODY MASS INDEX: 30.78 KG/M2 | TEMPERATURE: 98 F | WEIGHT: 215 LBS | SYSTOLIC BLOOD PRESSURE: 122 MMHG

## 2017-07-13 DIAGNOSIS — F17.200 SMOKER: ICD-10-CM

## 2017-07-13 DIAGNOSIS — N40.1 BENIGN NODULAR PROSTATIC HYPERPLASIA WITH LOWER URINARY TRACT SYMPTOMS: Primary | ICD-10-CM

## 2017-07-13 DIAGNOSIS — E78.2 MIXED DYSLIPIDEMIA: ICD-10-CM

## 2017-07-13 PROCEDURE — 99212 OFFICE O/P EST SF 10 MIN: CPT | Performed by: INTERNAL MEDICINE

## 2017-07-13 PROCEDURE — 99406 BEHAV CHNG SMOKING 3-10 MIN: CPT | Performed by: INTERNAL MEDICINE

## 2017-07-13 PROCEDURE — 99214 OFFICE O/P EST MOD 30 MIN: CPT | Performed by: INTERNAL MEDICINE

## 2017-07-13 RX ORDER — ATORVASTATIN CALCIUM 80 MG/1
TABLET, FILM COATED ORAL
Qty: 90 TABLET | Refills: 3 | Status: SHIPPED | OUTPATIENT
Start: 2017-07-13 | End: 2018-07-15

## 2017-07-13 RX ORDER — FINASTERIDE 5 MG/1
TABLET, FILM COATED ORAL
Qty: 90 TABLET | Refills: 3 | Status: SHIPPED | OUTPATIENT
Start: 2017-07-13 | End: 2018-06-24

## 2017-07-13 RX ORDER — TERAZOSIN 5 MG/1
CAPSULE ORAL
Qty: 90 CAPSULE | Refills: 3 | Status: SHIPPED | OUTPATIENT
Start: 2017-07-13 | End: 2018-06-24

## 2017-07-13 NOTE — PROGRESS NOTES
Patient ID: Georgia Jehovah's witness is a 63year old male. Patient presents with:  Post-Op: Cystoscopy done 7-11-17       HISTORY OF PRESENT ILLNESS:   HPI  Patient presents for above.   Here for follow-up after having a cystoscopy and greenlight laser peel for Surgical History:  6/3/2014: CARPAL TUNNEL RELEASE Right  2010,1011: COLONOSCOPY  2/6/2017: EGD N/A      Comment: Procedure: ESOPHAGOGASTRODUODENOSCOPY (EGD);                  Surgeon: Scotty Singh MD;  Location:                [address] [place] [address] ENDOSCOPY  8/16/ Does not apply Misc, USE TWICE DAILY, Disp: 102 each, Rfl: 2  •  DICYCLOMINE HCL 10 MG Oral Cap, TAKE 1 CAPSULE BY MOUTH FOUR TIMES DAILY AS NEEDED (Patient taking differently: TAKE 1 CAPSULE BY MOUTH twice a day), Disp: 120 capsule, Rfl: 11  •  PANTOPRAZO 20. 00 Years     Types: Cigarettes    Smokeless tobacco: Never Used    Comment: > 1 per day    Alcohol use Yes  0.0 oz/week     Comment: 2 beers once a week    Drug use: No    Sexual activity: Not on file     Other Topics Concern    Caffeine Concern Yes

## 2017-07-13 NOTE — PATIENT INSTRUCTIONS
BPH (Enlarged Prostate)  The prostate is a gland at the base of the bladder. As some men get older, the prostate may get bigger in size. This problem is called benign prostatic hyperplasia (BPH). BPH puts pressure on the urethra.  This is the tube that ca BPH does not increase the risk of prostate cancer. But because prostate cancer is a common cancer in men, screening is sometimes recommended. This may help detect the cancer in its early stages when treatment is most effective.  Factors that can increase th

## 2017-07-17 ENCOUNTER — TELEPHONE (OUTPATIENT)
Dept: INTERNAL MEDICINE CLINIC | Facility: CLINIC | Age: 62
End: 2017-07-17

## 2017-07-17 DIAGNOSIS — Z98.890 STATUS POST CYSTOSCOPY: Primary | ICD-10-CM

## 2017-07-17 NOTE — TELEPHONE ENCOUNTER
Pt calling requesting referral for post op appt with Dr Sammi Finney on 07/26. Please call when referral approved.

## 2017-07-17 NOTE — TELEPHONE ENCOUNTER
Please call patient and offer f/u on Wed 7/19/17 or Wed 7/26 @ 1:40 pm (test spot). OK per Latonia. Thanks. Routed to KATERINA. Thanks.

## 2017-07-19 ENCOUNTER — TELEPHONE (OUTPATIENT)
Dept: SURGERY | Facility: CLINIC | Age: 62
End: 2017-07-19

## 2017-07-19 NOTE — TELEPHONE ENCOUNTER
I spoke with pt and determined that he did take AZO a few days ago and the urine color was red/orange then but then it cleared and then today he noticed that it was an jocelyn color again.  I explained that it could be some old blood discoloring the urine and

## 2017-07-26 ENCOUNTER — OFFICE VISIT (OUTPATIENT)
Dept: SURGERY | Facility: CLINIC | Age: 62
End: 2017-07-26

## 2017-07-26 VITALS
DIASTOLIC BLOOD PRESSURE: 74 MMHG | TEMPERATURE: 98 F | SYSTOLIC BLOOD PRESSURE: 128 MMHG | WEIGHT: 215 LBS | HEART RATE: 74 BPM | BODY MASS INDEX: 32.58 KG/M2 | HEIGHT: 68 IN

## 2017-07-26 DIAGNOSIS — N40.1 HYPERPLASIA OF PROSTATE WITH LOWER URINARY TRACT SYMPTOMS (LUTS): Primary | ICD-10-CM

## 2017-07-26 PROCEDURE — 99024 POSTOP FOLLOW-UP VISIT: CPT | Performed by: UROLOGY

## 2017-07-26 PROCEDURE — 99212 OFFICE O/P EST SF 10 MIN: CPT | Performed by: UROLOGY

## 2017-07-26 NOTE — PROGRESS NOTES
Tala Spangler is a 63year old male.     HPI:   Patient presents with:  Procedure Follow Up: patient presents for f/u on greenlight laser done on 7/11/17       53-year-old male presents in follow-up to cystoscopy and greenlight laser vaporization of the ROTATOR CUFF,ACUTE Right  1994: T&A  No date: TONSILLECTOMY  2011: UPPER GI ENDOSCOPY,EXAM      Comment: EGD with biopsy   Family History   Problem Relation Age of Onset   • Heart Surgery Father      CABG   • Diabetes Father 64     type 1 (cause of death) strip Rfl: 0   ACCU-CHEK FASTCLIX LANCETS Does not apply Misc USE TWICE DAILY Disp: 102 each Rfl: 2   DICYCLOMINE HCL 10 MG Oral Cap TAKE 1 CAPSULE BY MOUTH FOUR TIMES DAILY AS NEEDED (Patient taking differently: TAKE 1 CAPSULE BY MOUTH twice a day) Disp: diagnosis)    Reviewed findings at length with patient. Recommended follow-up in 2 months. He will call if he has any problems or concerns. Orders This Visit:  No orders of the defined types were placed in this encounter.       Meds This Visit:

## 2017-07-27 ENCOUNTER — APPOINTMENT (OUTPATIENT)
Dept: LAB | Age: 62
End: 2017-07-27
Attending: INTERNAL MEDICINE
Payer: COMMERCIAL

## 2017-07-27 ENCOUNTER — OFFICE VISIT (OUTPATIENT)
Dept: INTERNAL MEDICINE CLINIC | Facility: CLINIC | Age: 62
End: 2017-07-27

## 2017-07-27 ENCOUNTER — TELEPHONE (OUTPATIENT)
Dept: INTERNAL MEDICINE CLINIC | Facility: CLINIC | Age: 62
End: 2017-07-27

## 2017-07-27 VITALS
WEIGHT: 213 LBS | BODY MASS INDEX: 32.28 KG/M2 | HEART RATE: 71 BPM | HEIGHT: 68 IN | DIASTOLIC BLOOD PRESSURE: 75 MMHG | SYSTOLIC BLOOD PRESSURE: 122 MMHG | TEMPERATURE: 98 F

## 2017-07-27 DIAGNOSIS — K64.8 BLEEDING INTERNAL HEMORRHOIDS: ICD-10-CM

## 2017-07-27 DIAGNOSIS — E78.00 PURE HYPERCHOLESTEROLEMIA: ICD-10-CM

## 2017-07-27 DIAGNOSIS — E11.9 TYPE 2 DIABETES MELLITUS WITHOUT COMPLICATION, UNSPECIFIED LONG TERM INSULIN USE STATUS: ICD-10-CM

## 2017-07-27 DIAGNOSIS — Z00.00 ANNUAL PHYSICAL EXAM: Primary | ICD-10-CM

## 2017-07-27 DIAGNOSIS — F17.200 SMOKER: ICD-10-CM

## 2017-07-27 DIAGNOSIS — G47.33 OSA (OBSTRUCTIVE SLEEP APNEA): ICD-10-CM

## 2017-07-27 DIAGNOSIS — K21.9 GASTROESOPHAGEAL REFLUX DISEASE WITHOUT ESOPHAGITIS: ICD-10-CM

## 2017-07-27 DIAGNOSIS — N40.1 BENIGN NODULAR PROSTATIC HYPERPLASIA WITH LOWER URINARY TRACT SYMPTOMS: ICD-10-CM

## 2017-07-27 DIAGNOSIS — Z00.00 ANNUAL PHYSICAL EXAM: ICD-10-CM

## 2017-07-27 DIAGNOSIS — I10 ESSENTIAL HYPERTENSION: ICD-10-CM

## 2017-07-27 LAB
CHOLEST SERPL-MCNC: 153 MG/DL (ref 110–200)
HBA1C MFR BLD: 6.2 % (ref 4–6)
HDLC SERPL-MCNC: 26 MG/DL
LDLC SERPL CALC-MCNC: 67 MG/DL (ref 0–99)
NONHDLC SERPL-MCNC: 127 MG/DL
TRIGL SERPL-MCNC: 300 MG/DL (ref 1–149)
TSH SERPL-ACNC: 1.84 UIU/ML (ref 0.45–5.33)

## 2017-07-27 PROCEDURE — 80061 LIPID PANEL: CPT

## 2017-07-27 PROCEDURE — 83036 HEMOGLOBIN GLYCOSYLATED A1C: CPT

## 2017-07-27 PROCEDURE — 99214 OFFICE O/P EST MOD 30 MIN: CPT | Performed by: INTERNAL MEDICINE

## 2017-07-27 PROCEDURE — 99406 BEHAV CHNG SMOKING 3-10 MIN: CPT | Performed by: INTERNAL MEDICINE

## 2017-07-27 PROCEDURE — 84443 ASSAY THYROID STIM HORMONE: CPT

## 2017-07-27 PROCEDURE — 90471 IMMUNIZATION ADMIN: CPT | Performed by: INTERNAL MEDICINE

## 2017-07-27 PROCEDURE — 36415 COLL VENOUS BLD VENIPUNCTURE: CPT

## 2017-07-27 PROCEDURE — 99396 PREV VISIT EST AGE 40-64: CPT | Performed by: INTERNAL MEDICINE

## 2017-07-27 PROCEDURE — 90732 PPSV23 VACC 2 YRS+ SUBQ/IM: CPT | Performed by: INTERNAL MEDICINE

## 2017-07-27 RX ORDER — DICYCLOMINE HYDROCHLORIDE 10 MG/1
CAPSULE ORAL
Qty: 120 CAPSULE | Refills: 11 | Status: SHIPPED | OUTPATIENT
Start: 2017-07-27 | End: 2018-08-27

## 2017-07-27 RX ORDER — FENOFIBRATE 160 MG/1
TABLET ORAL
Qty: 90 TABLET | Refills: 0 | Status: SHIPPED | OUTPATIENT
Start: 2017-07-27 | End: 2017-10-30

## 2017-07-27 NOTE — PROGRESS NOTES
Patient ID: Susan Aparicio is a 63year old male. Patient presents with:  Physical: Colonoscopy done by Dr. Duong Hurtado at [facility] about 5 years ago       HISTORY OF PRESENT ILLNESS:   HPI  Patient presents for above.   Here for complete physical. Hemorrhoids 2014, 1997    hemorrhoidectomy   • High blood pressure    • High cholesterol    • History of stomach ulcers    • Hyperlipidemia    • Obesity    • Osteoarthritis    • Right rotator cuff tear 2/3/2011   • Sleep apnea     uses cpap machine   • Ton Disp: 90 tablet, Rfl: 0  •  CETIRIZINE 10 MG Oral Tab, TAKE 1 TABLET(10 MG) BY MOUTH EVERY EVENING, Disp: 90 tablet, Rfl: 0  •  gabapentin 300 MG Oral Cap, TAKE 1 CAPSULE BY MOUTH TWICE DAILY (Patient taking differently: 600 mg.  ), Disp: 180 capsule, Rfl: Topics   Smoking status: Current Every Day Smoker  1.50 Packs/day  For 20.00 Years     Types: Cigarettes    Smokeless tobacco: Never Used    Comment: > 1 per day    Alcohol use Yes  0.0 oz/week     Comment: 2 beers once a week    Drug use: No    Sexual act doing so. 5. Type 2 diabetes mellitus without complication, unspecified long term insulin use status (HCC)  · HEMOGLOBIN A1C; Future  · PODIATRY - INTERNAL  · Eye exam due in 5 months. 6. Bleeding internal hemorrhoids  · SURGERY - INTERNAL    7.  Roxy

## 2017-07-27 NOTE — PATIENT INSTRUCTIONS
Benign Prostatic Hyperplasia    The prostate is a small gland that makes semen. As you age, the prostate grows. If it becomes too big, it may cause problems with urination. This condition is called benign prostatic hyperplasia (BPH).   Symptoms of BPH  BP Kegel exercises may also help. They strengthen the pelvic muscle to prevent urine from leaking. While urinating, contract your pelvic muscle to stop or slow down the flow of urine. Hold for 10 seconds. Repeat at least 5 times.  Do the exercise 3 to 5 times Obstructive sleep apnea is a condition that causes your air passages to become narrowed or blocked during sleep. As a result, breathing stops for short periods. Your body wakes up enough for breathing to begin again, though you don't remember it.  The cycle Sleep apnea can make you more likely to have certain health problems. These include high blood pressure, heart attack, stroke, and sexual dysfunction. If you have sleep apnea, talk to your healthcare provider about the best treatments for you.   Date Last R HIV Men at increased risk for infection – talk with your healthcare provider At routine exams   Lung cancer Adults age 54 to [de-identified] who have smoked Yearly screening in smokers with 30 pack-year history of smoking or who quit within 15 years   Obesity All men i Pneumococcal conjugate vaccine (PCV13) and pneumococcal polysaccharide vaccine (PPSV23) Men at increased risk for infection – talk with your healthcare provider PCV13: 1 dose ages 23 to 72 (protects against 13 types of pneumococcal bacteria)     PPSV23: 1

## 2017-07-27 NOTE — TELEPHONE ENCOUNTER
I forgot to give him a referral for a podiatrist to do his annual check because of the diabetes. He please call him and given the information for the referral I put in.

## 2017-07-28 NOTE — TELEPHONE ENCOUNTER
Spoke to pt, informed him that referral was ordered and is authorized. Pt has the phone number and will call for appt.

## 2017-08-08 ENCOUNTER — TELEPHONE (OUTPATIENT)
Dept: INTERNAL MEDICINE CLINIC | Facility: CLINIC | Age: 62
End: 2017-08-08

## 2017-08-08 NOTE — TELEPHONE ENCOUNTER
Patient reports being unable to access My Chart.  Requesting results:    Notes Recorded by Noe Mills on 8/4/2017 at 7:46 AM CDT  Tried calling patient again, letter mailed with diet  ------    Notes Recorded by Bill Leon MD on 8/3/2017 at 6:55 PM

## 2017-08-08 NOTE — TELEPHONE ENCOUNTER
Patient reports that he needs refill on the following med. He just had physical with Dr Ruben Kyle on 7/27/17. Current Outpatient Prescriptions:  Losartan Potassium 50 MG Oral Tab Take 1 tablet (50 mg total) by mouth daily.  Disp: 90 tablet Rfl: 3     Request

## 2017-08-08 NOTE — TELEPHONE ENCOUNTER
Pt called and informed him test results also resent him info for signing up for mychart as he requested.

## 2017-08-11 ENCOUNTER — TELEPHONE (OUTPATIENT)
Dept: SURGERY | Facility: CLINIC | Age: 62
End: 2017-08-11

## 2017-08-11 ENCOUNTER — OFFICE VISIT (OUTPATIENT)
Dept: SURGERY | Facility: CLINIC | Age: 62
End: 2017-08-11

## 2017-08-11 DIAGNOSIS — K62.89 RECTAL PAIN: Primary | ICD-10-CM

## 2017-08-11 DIAGNOSIS — K64.1 GRADE II HEMORRHOIDS: ICD-10-CM

## 2017-08-11 DIAGNOSIS — N40.0 BENIGN PROSTATIC HYPERPLASIA, UNSPECIFIED WHETHER LOWER URINARY TRACT SYMPTOMS PRESENT: Primary | ICD-10-CM

## 2017-08-11 PROCEDURE — 99215 OFFICE O/P EST HI 40 MIN: CPT | Performed by: SURGERY

## 2017-08-11 PROCEDURE — 99212 OFFICE O/P EST SF 10 MIN: CPT | Performed by: SURGERY

## 2017-08-11 NOTE — TELEPHONE ENCOUNTER
Dr. Madaline Goldmann is seing this patient for difficulty defecating. States on ELIAS has a ridge of hard tissue more pronounced left lateral aspect of pelvis near prostate. I repeat his ELIAS.   This shows a ridge of soft tissue (tendon like) symmetric on both side

## 2017-08-11 NOTE — TELEPHONE ENCOUNTER
Dr. Eliud Abernathy I called MRI [id number] spoke with Kishore who states if pt has a ct scan contract allergy that is a different contrast from the MRI.  I then called pt to verify his Iodine allergy, pt states it was back in 1982 was called a \"red dye # 7\" he had a

## 2017-08-14 ENCOUNTER — HOSPITAL ENCOUNTER (OUTPATIENT)
Dept: MRI IMAGING | Facility: HOSPITAL | Age: 62
Discharge: HOME | End: 2017-08-14
Attending: UROLOGY
Payer: COMMERCIAL

## 2017-08-14 DIAGNOSIS — N40.0 BENIGN PROSTATIC HYPERPLASIA, UNSPECIFIED WHETHER LOWER URINARY TRACT SYMPTOMS PRESENT: ICD-10-CM

## 2017-08-14 PROCEDURE — A9575 INJ GADOTERATE MEGLUMI 0.1ML: HCPCS | Performed by: UROLOGY

## 2017-08-14 PROCEDURE — 72197 MRI PELVIS W/O & W/DYE: CPT | Performed by: UROLOGY

## 2017-08-14 NOTE — IMAGING NOTE
1201:  1.0 mg Glucagon administered IM to right deltoid as per MRI enterography protocol ordered by Dr. Pauline Rizzo. Mr. Rustam Bill reported last blood glucose level 130.   Instructed to monitor blood glucose post procedure and take oral hypoglycemic medications

## 2017-08-14 NOTE — PROGRESS NOTES
Follow Up Visit Note       Active Problems   Rectal pain  (primary encounter diagnosis)  Grade ii hemorrhoids  Chief Complaint: Patient presents with:  Hemorrhoids: Pt c/o painful bleeding hemorrhoids since July 26th.   Pt states he had a procdure with  NIGHT AT BEDTIME Disp: 90 tablet Rfl: 3   finasteride 5 MG Oral Tab TAKE 1 TABLET(5 MG) BY MOUTH DAILY Disp: 90 tablet Rfl: 3   Terazosin HCl 5 MG Oral Cap TAKE 1 CAPSULE(5 MG) BY MOUTH EVERY NIGHT Disp: 90 capsule Rfl: 3   Omega-3 Fatty Acids (FISH OIL TR Negative. Eyes: Negative. Respiratory: Negative. Cardiovascular: Negative. Gastrointestinal: Positive for anal bleeding (Minimal and mostly after straining) and rectal pain. Change on stool caliber. Genitourinary: Negative.     Musculos hemorrhoids      Plan               Dr Crow Sarabia has seen the patient during this visit and the case of Mr Ashlyn Elizabeth was discussed with him.  Although we felt a firm nodular prostate and a ridge of firm tissue extending from the prostate or the wall of the rectum

## 2017-08-14 NOTE — PATIENT INSTRUCTIONS
Assessment   Rectal pain  (primary encounter diagnosis)  Grade II hemorrhoids      Plan               Dr Neena Jimenez has seen the patient during this visit and the case of Mr Johnathan Rosas was discussed with him.  Although we felt a firm nodular prostate and a ridge of

## 2017-08-14 NOTE — PROCEDURES
Patient in prone position. The anus and perineum were prepped and draped in the usual manner. Lidocaine 5% used as topical anesthesia. Anus was gently dilated and rectal digital exam performed .: no masses.  The Prostate is enlarged and Firm, Nodular with p

## 2017-08-15 RX ORDER — LOSARTAN POTASSIUM 50 MG/1
TABLET ORAL
Qty: 90 TABLET | Refills: 1 | Status: SHIPPED | OUTPATIENT
Start: 2017-08-15 | End: 2018-01-23

## 2017-08-15 RX ORDER — CETIRIZINE HYDROCHLORIDE 10 MG/1
TABLET ORAL
Qty: 90 TABLET | Refills: 1 | Status: SHIPPED | OUTPATIENT
Start: 2017-08-15 | End: 2018-02-02

## 2017-08-16 NOTE — TELEPHONE ENCOUNTER
Hypertensive Medications  Protocol Criteria:  · Appointment scheduled in the past 6 months or in the next 3 months  · BMP or CMP in the past 12 months  · Creatinine result < 2  Recent Outpatient Visits            4 days ago Rectal pain    [address] 07/01/2017    07/01/2017   K 4.0 07/01/2017    07/01/2017   CO2 25 07/01/2017   GLOBULIN 2.3 (L) 07/01/2017   AGRATIO 1.8 07/22/2016   ANIONGAP 10 07/01/2017   OSMOCALC 298 (H) 07/01/2017     Refill Protocol Appointment Criteria  · Appointment

## 2017-08-17 ENCOUNTER — OFFICE VISIT (OUTPATIENT)
Dept: GASTROENTEROLOGY | Facility: CLINIC | Age: 62
End: 2017-08-17

## 2017-08-17 ENCOUNTER — TELEPHONE (OUTPATIENT)
Dept: GASTROENTEROLOGY | Facility: CLINIC | Age: 62
End: 2017-08-17

## 2017-08-17 VITALS
BODY MASS INDEX: 32.28 KG/M2 | HEIGHT: 68 IN | HEART RATE: 75 BPM | WEIGHT: 213 LBS | DIASTOLIC BLOOD PRESSURE: 77 MMHG | SYSTOLIC BLOOD PRESSURE: 126 MMHG

## 2017-08-17 DIAGNOSIS — R19.4 CHANGE IN BOWEL HABITS: Primary | ICD-10-CM

## 2017-08-17 PROCEDURE — 99212 OFFICE O/P EST SF 10 MIN: CPT | Performed by: INTERNAL MEDICINE

## 2017-08-17 PROCEDURE — 99213 OFFICE O/P EST LOW 20 MIN: CPT | Performed by: INTERNAL MEDICINE

## 2017-08-17 NOTE — TELEPHONE ENCOUNTER
Scheduled for:  Flexible Sig [id number]  Provider Name:   Date:  10/31/17  Location:  [facility]  Sedation:  No sedation  Time:  09[facility] 0800  Prep:Fleets Enema  Meds/Allergies Reconciled?:  Physician reviewed  Diagnosis with codes:  [address]

## 2017-08-18 ENCOUNTER — PATIENT MESSAGE (OUTPATIENT)
Dept: INTERNAL MEDICINE CLINIC | Facility: CLINIC | Age: 62
End: 2017-08-18

## 2017-08-21 ENCOUNTER — TELEPHONE (OUTPATIENT)
Dept: PULMONOLOGY | Facility: CLINIC | Age: 62
End: 2017-08-21

## 2017-08-21 DIAGNOSIS — G47.33 OSA (OBSTRUCTIVE SLEEP APNEA): Primary | ICD-10-CM

## 2017-08-21 NOTE — TELEPHONE ENCOUNTER
Pt states per HME he needs a referral for CPAP Supplies. Pt has appt scheduled with AR on 9/6/17 but would need referral before then.  Please Call Thank You

## 2017-08-22 ENCOUNTER — PATIENT MESSAGE (OUTPATIENT)
Dept: INTERNAL MEDICINE CLINIC | Facility: CLINIC | Age: 62
End: 2017-08-22

## 2017-08-22 ENCOUNTER — OFFICE VISIT (OUTPATIENT)
Dept: PODIATRY CLINIC | Facility: CLINIC | Age: 62
End: 2017-08-22

## 2017-08-22 DIAGNOSIS — E11.9 CONTROLLED TYPE 2 DIABETES MELLITUS WITHOUT COMPLICATION, WITHOUT LONG-TERM CURRENT USE OF INSULIN (HCC): Primary | ICD-10-CM

## 2017-08-22 DIAGNOSIS — L60.0 INGROWN LEFT BIG TOENAIL: ICD-10-CM

## 2017-08-22 PROCEDURE — 99213 OFFICE O/P EST LOW 20 MIN: CPT | Performed by: PODIATRIST

## 2017-08-22 PROCEDURE — 99212 OFFICE O/P EST SF 10 MIN: CPT | Performed by: PODIATRIST

## 2017-08-22 NOTE — TELEPHONE ENCOUNTER
Diabetes Medications  Protocol Criteria:  · Appointment scheduled in the past 6 months or the next 3 months  · A1C < 7.5 in the past 6 months  · Creatinine in the past 12 months  · Creatinine result < 1.5   Recent Outpatient Visits            5 days ago

## 2017-08-22 NOTE — TELEPHONE ENCOUNTER
From: Nicolette Cramer  To: Olga Cadet MD  Sent: 8/18/2017 4:58 PM CDT  Subject: Prescription Question    I need a prescription filled please . METFORMIN 500 mg tablets 4 by mouth after supper 90 day supply .  RX number [id number] DR Leisa Navarro required Jay Jay

## 2017-08-22 NOTE — TELEPHONE ENCOUNTER
From: Kaylah Boggs  To: Lori Bedoya MD  Sent: 8/18/2017 5:03 PM CDT  Subject: Prescription Question    I need a refill of GABAPENTIN 300mg capsules 90 day supply thank you Jacob Paredes

## 2017-08-22 NOTE — PROGRESS NOTES
HPI:    Patient ID: Codey Randle is a 63year old male. HPI  This 53-year-old diabetic presents to the office having not been seen in a year. He states that his fasting blood sugar today was 135. His most recent A1c was 6.2.   His chief complaint i taking differently: take 4 tablets daily at dinner) Disp: 360 tablet Rfl: 0   Losartan Potassium 50 MG Oral Tab Take 1 tablet (50 mg total) by mouth daily. Disp: 90 tablet Rfl: 3   Dermatological Products, Misc.  (RESINOL) 55-2 % Apply Externally Ointment A mellitus without complication, without long-term current use of insulin (hcc)  (primary encounter diagnosis)  Ingrown left big toenail    No orders of the defined types were placed in this encounter.       Meds This Visit:  No prescriptions requested or ord

## 2017-08-22 NOTE — TELEPHONE ENCOUNTER
Gabapentin refill request-  Actually has active refills on file.     Refill Protocol Appointment Criteria  · Appointment scheduled in the past 6 months or in the next 3 months  Recent Outpatient Visits            5 days ago     [address], [facility]

## 2017-08-23 ENCOUNTER — PATIENT MESSAGE (OUTPATIENT)
Dept: INTERNAL MEDICINE CLINIC | Facility: CLINIC | Age: 62
End: 2017-08-23

## 2017-08-23 NOTE — PROGRESS NOTES
HPI:    Patient ID: Binu Aiken is a 63year old male. HPI  Nikki Coleman  returns in follow-up at the request of Dr. Nikki Castillo. He was last seen at endoscopy in February 2017 and in the office in December 2016.     As per previous notes he has a history of e punctate ulcerations/excoriations of uncertain etiology. Last complete colonoscopy was in November 2020 with removal of hyperplastic polyps. The patient is due for a screening colonoscopy November 2020.       Review of Systems  See above    Wt Readings fr rectal area as directed per MD. Disp: 1 each Rfl: 3   Ascorbic Acid (VITAMIN C) 100 MG Oral Tab Take  by mouth. Disp:  Rfl:    aspirin (ASPIR-81) 81 MG Oral Tab EC Take  by mouth.  Disp:  Rfl:    Cholecalciferol (VITAMIN D3) 400 UNITS Oral Cap Take 1,000 Un place, and time. Psychiatric: He has a normal mood and affect. His behavior is normal.   Nursing note and vitals reviewed. PROCEDURE:            MRI PROSTATE(W+WO) (CPT=72197)     COMPARISON:           None available.      INDICATIONS:            Craig BONES:                       No suspicious marrow signal abnormality is detected. BODY WALL:              There small bilateral fat-containing hernias. OTHER:                       Distal colonic diverticulosis is partially visualized.  There is right LIVER:                         The liver is enlarged, measuring 20.3 cm. The liver is increased in echogenicity with coarsening of the echotexture and decreased penetration of the sound beam, which limits evaluation for small masses.  There is a small av GFR, -American      >=60 >60   WBC      4.0 - 11.0 K/UL 4.0   RBC      4.50 - 5.90 M/UL 4.36 (L)   Hemoglobin      13.5 - 17.5 g/dL 14.2   Hematocrit      41.0 - 52.0 % 40.4 (L)   MCV      80.0 - 100.0 fL 92.6   MCH      27.0 - 32.0 pg 32.5 (H) Visit:  No prescriptions requested or ordered in this encounter    Imaging & Referrals:  None       ID#[id number] due for hepatic laboratory testing and liver ultrasound

## 2017-08-25 RX ORDER — FAMOTIDINE 40 MG/1
TABLET, FILM COATED ORAL
Qty: 90 TABLET | Refills: 3 | Status: SHIPPED | OUTPATIENT
Start: 2017-08-25 | End: 2018-08-21

## 2017-08-25 NOTE — TELEPHONE ENCOUNTER
Jovon Light from [facility] stated that patient still has active refills on the gabapentin and will refill the medication today. Patient advised.      Pharmacy     [place] [address] [address], [address] [facility], [phone]

## 2017-08-25 NOTE — TELEPHONE ENCOUNTER
Refill Protocol Appointment Criteria  · Appointment scheduled in the past 12 months or in the next 3 months  Recent Outpatient Visits            3 days ago Controlled type 2 diabetes mellitus without complication, without long-term current use of insulin (

## 2017-08-25 NOTE — TELEPHONE ENCOUNTER
From: Momspot  To: Agustin Moe MD  Sent: 8/22/2017 3:39 PM CDT  Subject: Prescription Question    GABAPENTIN is under Dr Alfonso De La Garza name . I was told that when I need a refill that I should contact new Doctor ,or should I just call [facility] if it

## 2017-08-25 NOTE — TELEPHONE ENCOUNTER
From: Susan Aparicio  To: Ayd Miranda MD  Sent: 8/23/2017 5:28 PM CDT  Subject: Prescription Question    I need a refill for Famotidine 40mg tablets 90 day supply . No refill Doctor Authorization required .  Thank you Keisha Ring [phone]

## 2017-08-30 ENCOUNTER — OFFICE VISIT (OUTPATIENT)
Dept: SURGERY | Facility: CLINIC | Age: 62
End: 2017-08-30

## 2017-08-30 VITALS
HEIGHT: 69 IN | TEMPERATURE: 98 F | WEIGHT: 213 LBS | HEART RATE: 75 BPM | BODY MASS INDEX: 31.55 KG/M2 | DIASTOLIC BLOOD PRESSURE: 86 MMHG | SYSTOLIC BLOOD PRESSURE: 146 MMHG

## 2017-08-30 DIAGNOSIS — N40.0 BENIGN PROSTATIC HYPERPLASIA, UNSPECIFIED WHETHER LOWER URINARY TRACT SYMPTOMS PRESENT: Primary | ICD-10-CM

## 2017-08-30 PROCEDURE — 99024 POSTOP FOLLOW-UP VISIT: CPT | Performed by: UROLOGY

## 2017-08-30 PROCEDURE — 99212 OFFICE O/P EST SF 10 MIN: CPT | Performed by: UROLOGY

## 2017-09-06 ENCOUNTER — OFFICE VISIT (OUTPATIENT)
Dept: PULMONOLOGY | Facility: CLINIC | Age: 62
End: 2017-09-06

## 2017-09-06 ENCOUNTER — TELEPHONE (OUTPATIENT)
Dept: PULMONOLOGY | Facility: CLINIC | Age: 62
End: 2017-09-06

## 2017-09-06 VITALS — HEIGHT: 69 IN | WEIGHT: 218.63 LBS | RESPIRATION RATE: 18 BRPM | OXYGEN SATURATION: 97 % | BODY MASS INDEX: 32.38 KG/M2

## 2017-09-06 DIAGNOSIS — G47.33 OSA ON CPAP: Primary | ICD-10-CM

## 2017-09-06 DIAGNOSIS — Z99.89 OSA ON CPAP: Primary | ICD-10-CM

## 2017-09-06 PROCEDURE — 99213 OFFICE O/P EST LOW 20 MIN: CPT | Performed by: INTERNAL MEDICINE

## 2017-09-06 PROCEDURE — 99212 OFFICE O/P EST SF 10 MIN: CPT | Performed by: INTERNAL MEDICINE

## 2017-09-06 NOTE — PROGRESS NOTES
HPI:    Patient ID: Monet Jung is a 63year old male.     HPI  Much better on CPAP, started last month and he is feeling great  His energy improved significantly  No daytime sleepiness or tiredness  No technical issue with the CPAP  Mild occasional na PANTOPRAZOLE SODIUM 40 MG Oral Tab EC TAKE ONE TABLET BY MOUTH TWICE DAILY BEFORE MEALS Disp: 180 tablet Rfl: 3   Losartan Potassium 50 MG Oral Tab Take 1 tablet (50 mg total) by mouth daily. Disp: 90 tablet Rfl: 3   Dermatological Products, Misc.  (RESIN while using CPAP went down to 1.6 which is completely normal  Patient interested in nasal pillow which is okay with me  Continue with CPAP at 11 CWP  Continue diet and exercise and weight reduction  Avoid driving if sleepy  Avoid sedative narcotic and alco

## 2017-09-08 RX ORDER — PANTOPRAZOLE SODIUM 40 MG/1
40 TABLET, DELAYED RELEASE ORAL
Qty: 180 TABLET | Refills: 3 | Status: SHIPPED
Start: 2017-09-08 | End: 2018-09-26

## 2017-09-08 NOTE — TELEPHONE ENCOUNTER
From: Franco Hussein  Sent: 9/7/2017 8:21 PM CDT  Subject: Medication Renewal Request    Luis Echeverria.  Jimmy Amador would like a refill of the following medications:  PANTOPRAZOLE SODIUM 40 MG Oral Tab EC Louisa Harper MD]    Preferred pharmacy: [facility]

## 2017-09-08 NOTE — TELEPHONE ENCOUNTER
Pending Prescriptions Disp Refills    Pantoprazole Sodium 40 MG Oral Tab  tablet 3         See refill request  Patient last seen 8-17-17.    Medication last refilled 1-24-17

## 2017-09-27 DIAGNOSIS — N40.1 BENIGN NODULAR PROSTATIC HYPERPLASIA WITH LOWER URINARY TRACT SYMPTOMS: ICD-10-CM

## 2017-09-27 RX ORDER — TERAZOSIN 5 MG/1
CAPSULE ORAL
Qty: 90 CAPSULE | Refills: 3 | Status: CANCELLED
Start: 2017-09-27

## 2017-09-27 NOTE — TELEPHONE ENCOUNTER
[address] [place] [address], [phone], [phone]   Medication Detail    Disp Refills Start End    Terazosin HCl 5 MG Oral Cap 90 capsule 3 7/13/2017     Sig: TAKE 1 CAPSULE(5 MG) BY

## 2017-09-27 NOTE — TELEPHONE ENCOUNTER
From: Sharath Vazquez  Sent: 9/27/2017 9:04 AM CDT  Subject: Medication Renewal Request    Munira Liz.  Angela Valles would like a refill of the following medications:     Terazosin HCl 5 MG Oral Cap Kaden Etienne MD]    Preferred pharmacy: [address] [place] [address]

## 2017-10-02 NOTE — PROGRESS NOTES
Alva Tate is a 63year old male. HPI:   Patient presents with: Follow - Up: patient presents for mri of prostate results  BPH: f/u on greenlight pvp      63year old male here for followup to a visit 7/26/2017. Had laser TUR prostate 7/11/2017. side of the rectum.   1997: HEMORRHOIDECTOMY  2012: HERNIA SURGERY      Comment: umbilical  [id number]: KNEE ARTHROSCOPY Right  No date: OTHER      Comment: Prostate Surgery on 7/11/2017 1994: REPAIR OF NASAL SEPTUM      Comment: nasal septoplasty  2/3 TABLET BY MOUTH EVERY NIGHT AT BEDTIME Disp: 90 tablet Rfl: 3   finasteride 5 MG Oral Tab TAKE 1 TABLET(5 MG) BY MOUTH DAILY Disp: 90 tablet Rfl: 3   Terazosin HCl 5 MG Oral Cap TAKE 1 CAPSULE(5 MG) BY MOUTH EVERY NIGHT Disp: 90 capsule Rfl: 3   Omega-3 Fa problems or concerns. Orders This Visit:  No orders of the defined types were placed in this encounter.       Meds This Visit:    No prescriptions requested or ordered in this encounter    Imaging & Referrals:  None     10/1/2017  Nu Hastings MD

## 2017-10-03 DIAGNOSIS — K21.9 GASTROESOPHAGEAL REFLUX DISEASE WITHOUT ESOPHAGITIS: ICD-10-CM

## 2017-10-03 DIAGNOSIS — N40.1 BENIGN NODULAR PROSTATIC HYPERPLASIA WITH LOWER URINARY TRACT SYMPTOMS: ICD-10-CM

## 2017-10-03 RX ORDER — FINASTERIDE 5 MG/1
TABLET, FILM COATED ORAL
Qty: 90 TABLET | Refills: 3
Start: 2017-10-03

## 2017-10-03 RX ORDER — DICYCLOMINE HYDROCHLORIDE 10 MG/1
CAPSULE ORAL
Qty: 120 CAPSULE | Refills: 11
Start: 2017-10-03

## 2017-10-03 RX ORDER — TERAZOSIN 5 MG/1
CAPSULE ORAL
Qty: 90 CAPSULE | Refills: 3
Start: 2017-10-03

## 2017-10-03 NOTE — TELEPHONE ENCOUNTER
From: Luis E Lobato  Sent: 10/3/2017 2:33 PM CDT  Subject: Medication Renewal Request    Merton Lesch.  Nicolette Rodgers would like a refill of the following medications:     finasteride 5 MG Oral Tab Piter Robertson MD]     Terazosin HCl 5 MG Oral Cap Piter Robertson MD]

## 2017-10-04 RX ORDER — PANTOPRAZOLE SODIUM 40 MG/1
40 TABLET, DELAYED RELEASE ORAL
Qty: 180 TABLET | Refills: 3
Start: 2017-10-04

## 2017-10-05 RX ORDER — PANTOPRAZOLE SODIUM 40 MG/1
40 TABLET, DELAYED RELEASE ORAL
Qty: 180 TABLET | Refills: 3
Start: 2017-10-05

## 2017-10-05 NOTE — TELEPHONE ENCOUNTER
From: John Cox  Sent: 10/5/2017 1:44 PM CDT  Subject: Medication Renewal Request    Rocío Walls.  Servando Vaughn would like a refill of the following medications:     Pantoprazole Sodium 40 MG Oral Tab EC Herber Vásquez MD]    Preferred pharmacy: [facility]

## 2017-10-05 NOTE — TELEPHONE ENCOUNTER
See above refill request. Last seen in office  8/17/17. Last refilled  9/8/17  #180x3.     No refills needed at this time

## 2017-10-16 ENCOUNTER — TELEPHONE (OUTPATIENT)
Dept: INTERNAL MEDICINE CLINIC | Facility: CLINIC | Age: 62
End: 2017-10-16

## 2017-10-16 DIAGNOSIS — E11.9 DIABETIC EYE EXAM (HCC): Primary | ICD-10-CM

## 2017-10-16 DIAGNOSIS — Z01.00 DIABETIC EYE EXAM (HCC): Primary | ICD-10-CM

## 2017-10-30 ENCOUNTER — TELEPHONE (OUTPATIENT)
Dept: GASTROENTEROLOGY | Facility: CLINIC | Age: 62
End: 2017-10-30

## 2017-10-30 DIAGNOSIS — E78.00 PURE HYPERCHOLESTEROLEMIA: ICD-10-CM

## 2017-10-30 NOTE — TELEPHONE ENCOUNTER
Pt was advised to take one Fleets enema 2 hours prior to his procedure and another fleets enema one hour prior to his procedure. He verbalizes understanding.

## 2017-10-31 ENCOUNTER — HOSPITAL ENCOUNTER (OUTPATIENT)
Facility: HOSPITAL | Age: 62
Setting detail: HOSPITAL OUTPATIENT SURGERY
Discharge: HOME | End: 2017-10-31
Attending: INTERNAL MEDICINE | Admitting: INTERNAL MEDICINE
Payer: COMMERCIAL

## 2017-10-31 ENCOUNTER — SURGERY (OUTPATIENT)
Age: 62
End: 2017-10-31

## 2017-10-31 DIAGNOSIS — R19.4 CHANGE IN BOWEL HABITS: ICD-10-CM

## 2017-10-31 PROCEDURE — 45331 SIGMOIDOSCOPY AND BIOPSY: CPT | Performed by: INTERNAL MEDICINE

## 2017-10-31 PROCEDURE — 0DBP8ZX EXCISION OF RECTUM, VIA NATURAL OR ARTIFICIAL OPENING ENDOSCOPIC, DIAGNOSTIC: ICD-10-PCS | Performed by: INTERNAL MEDICINE

## 2017-10-31 RX ORDER — MIDAZOLAM HYDROCHLORIDE 1 MG/ML
1 INJECTION INTRAMUSCULAR; INTRAVENOUS EVERY 5 MIN PRN
Status: DISCONTINUED | OUTPATIENT
Start: 2017-10-31 | End: 2017-10-31

## 2017-10-31 RX ORDER — FENOFIBRATE 160 MG/1
TABLET ORAL
Qty: 90 TABLET | Refills: 1 | Status: SHIPPED
Start: 2017-10-31 | End: 2018-04-21

## 2017-10-31 RX ORDER — SODIUM CHLORIDE 0.9 % (FLUSH) 0.9 %
10 SYRINGE (ML) INJECTION AS NEEDED
Status: DISCONTINUED | OUTPATIENT
Start: 2017-10-31 | End: 2017-10-31

## 2017-10-31 RX ORDER — SODIUM CHLORIDE, SODIUM LACTATE, POTASSIUM CHLORIDE, CALCIUM CHLORIDE 600; 310; 30; 20 MG/100ML; MG/100ML; MG/100ML; MG/100ML
INJECTION, SOLUTION INTRAVENOUS CONTINUOUS
Status: DISCONTINUED | OUTPATIENT
Start: 2017-10-31 | End: 2017-10-31

## 2017-10-31 NOTE — TELEPHONE ENCOUNTER
Chart reviewed. Refills sent per Triage Dept protocol.    LOV 7/27/17 for annual.   Cholesterol Medications  Protocol Criteria:  · Appointment scheduled in the past 12 months or in the next 3 months  · ALT & LDL on file in the past 12 months  · ALT result <

## 2017-10-31 NOTE — H&P
History & Physical Examination    Patient Name: Elizabeth Conn  MRN: [id number]  CSN: [id number]  YOB: 1955    Diagnosis: Change in bowel habits        Prescriptions Prior to Admission:  Pantoprazole Sodium 40 MG Oral Tab EC Take 1 tablet Vitamins-Minerals (CENTRUM SILVER ULTRA MENS) Oral Tab Take  by mouth.  Disp:  Rfl:  10/30/2017   ACCU-CHEK BEV PLUS In Vitro Strip USE TWICE DAILY Disp: 200 strip Rfl: 0 Taking   ACCU-CHEK FASTCLIX LANCETS Does not apply Misc USE TWICE DAILY Disp: 102 ea rectal mucosal prolapse of the posterior and                left side of the rectum.   1997: HEMORRHOIDECTOMY  2012: HERNIA SURGERY      Comment: umbilical  [id number]: KNEE ARTHROSCOPY Right  No date: OTHER      Comment: Prostate Surgery on 7/11/2017

## 2017-10-31 NOTE — OPERATIVE REPORT
[facility] Endoscopy Report      Date of Procedure:  10/31/17      Preoperative Diagnosis:  1. Alteration in bowel habits  2. Up-to-date with colonoscopic screening      Postoperative Diagnosis:  1. Diminutive rectal polyps  2.   Sigmoid mass lesions or vascular anomalies seen. There were no signs of inflammation in the rectum. Retroflexion in the rectum revealed scar deformity from prior [address] hemorrhoidectomy. The procedure was well tolerated without immediate complication.       Impress

## 2017-10-31 NOTE — TELEPHONE ENCOUNTER
From: Agustina Guevara  Sent: 10/30/2017 8:28 PM CDT  Subject: Medication Renewal Request    Noble Torres.  Chaya Kay would like a refill of the following medications:     Fenofibrate 160 MG Oral Tab Bridgette Archuleta MD]    Preferred pharmacy: [place] [address]

## 2017-11-01 VITALS
DIASTOLIC BLOOD PRESSURE: 80 MMHG | OXYGEN SATURATION: 98 % | WEIGHT: 205 LBS | HEIGHT: 68 IN | SYSTOLIC BLOOD PRESSURE: 128 MMHG | RESPIRATION RATE: 21 BRPM | HEART RATE: 74 BPM | BODY MASS INDEX: 31.07 KG/M2

## 2017-11-08 ENCOUNTER — TELEPHONE (OUTPATIENT)
Dept: GASTROENTEROLOGY | Facility: CLINIC | Age: 62
End: 2017-11-08

## 2017-11-08 DIAGNOSIS — Z86.010 HX OF COLONIC POLYPS: Primary | ICD-10-CM

## 2017-11-08 NOTE — TELEPHONE ENCOUNTER
Pt requesting to schedule cln/procedure, pt informed about 72 hr cb, pls call at:[phone],thanks.

## 2017-11-09 NOTE — TELEPHONE ENCOUNTER
Scheduled for:  Colonoscopy - [address]  Provider Name:  Dr. Pete Barrera  Date:  1/23/18  Location:  [facility]  Sedation:  IV  Time:  10:15 am (pt is aware to arrive at 9:15 am)  Prep:  Suprep, Prep instructions were given to pt over the phone, pt verbalized understanding.

## 2017-11-09 NOTE — TELEPHONE ENCOUNTER
Pt contacted and reviewed medications. He is aware he can take any medications other than metformin with a small sip of water the morning of.     Hold metformin the evening prior    Hold on vitamins, supplements, as needed medications the morning of until

## 2017-11-11 ENCOUNTER — IMMUNIZATION (OUTPATIENT)
Dept: INTERNAL MEDICINE CLINIC | Facility: CLINIC | Age: 62
End: 2017-11-11

## 2017-11-11 ENCOUNTER — MED REC SCAN ONLY (OUTPATIENT)
Dept: INTERNAL MEDICINE CLINIC | Facility: CLINIC | Age: 62
End: 2017-11-11

## 2017-11-11 DIAGNOSIS — Z23 NEED FOR VACCINATION: ICD-10-CM

## 2017-11-11 PROCEDURE — 90686 IIV4 VACC NO PRSV 0.5 ML IM: CPT | Performed by: INTERNAL MEDICINE

## 2017-11-11 PROCEDURE — 90471 IMMUNIZATION ADMIN: CPT | Performed by: INTERNAL MEDICINE

## 2017-11-14 NOTE — TELEPHONE ENCOUNTER
Please advise on refill request.    Diabetes Medications  Protocol Criteria:  · Appointment scheduled in the past 6 months or the next 3 months  · A1C < 7.5 in the past 6 months  · Creatinine in the past 12 months  · Creatinine result < 1.5   Recent Outpat

## 2017-11-15 ENCOUNTER — PATIENT MESSAGE (OUTPATIENT)
Dept: INTERNAL MEDICINE CLINIC | Facility: CLINIC | Age: 62
End: 2017-11-15

## 2017-11-15 DIAGNOSIS — Z12.11 COLON CANCER SCREENING: Primary | ICD-10-CM

## 2017-11-15 NOTE — TELEPHONE ENCOUNTER
Please see message below and advise    Also sent to [facility]    Does patient need new referral for 2018?

## 2017-11-15 NOTE — TELEPHONE ENCOUNTER
From: Franco Hussein  To: Dewey Vanegas MD  Sent: 11/15/2017 11:35 AM CST  Subject: Referral Request    I received a referral that is dated for a colonoscopy with Dr Chauhan  1-23-17 to 4-23 17 . I am having this procedure on 1-23 18 .  Not sure if the

## 2017-12-01 ENCOUNTER — TELEPHONE (OUTPATIENT)
Dept: GASTROENTEROLOGY | Facility: CLINIC | Age: 62
End: 2017-12-01

## 2017-12-01 DIAGNOSIS — K75.81 NONALCOHOLIC STEATOHEPATITIS (NASH): Primary | ICD-10-CM

## 2017-12-01 NOTE — TELEPHONE ENCOUNTER
Recall US liver for 6 months per GS.  Last US 6/1/17.  Due in December     Order pended above for sign off

## 2017-12-05 ENCOUNTER — OFFICE VISIT (OUTPATIENT)
Dept: OPTOMETRY | Facility: CLINIC | Age: 62
End: 2017-12-05

## 2017-12-05 DIAGNOSIS — H25.13 AGE-RELATED NUCLEAR CATARACT OF BOTH EYES: ICD-10-CM

## 2017-12-05 DIAGNOSIS — H52.203 MYOPIA WITH ASTIGMATISM AND PRESBYOPIA, BILATERAL: ICD-10-CM

## 2017-12-05 DIAGNOSIS — H52.4 MYOPIA WITH ASTIGMATISM AND PRESBYOPIA, BILATERAL: ICD-10-CM

## 2017-12-05 DIAGNOSIS — E11.9 TYPE 2 DIABETES MELLITUS WITHOUT COMPLICATION, WITHOUT LONG-TERM CURRENT USE OF INSULIN (HCC): Primary | ICD-10-CM

## 2017-12-05 DIAGNOSIS — H52.13 MYOPIA WITH ASTIGMATISM AND PRESBYOPIA, BILATERAL: ICD-10-CM

## 2017-12-05 PROCEDURE — 92015 DETERMINE REFRACTIVE STATE: CPT | Performed by: OPTOMETRIST

## 2017-12-05 PROCEDURE — 92014 COMPRE OPH EXAM EST PT 1/>: CPT | Performed by: OPTOMETRIST

## 2017-12-05 NOTE — PROGRESS NOTES
Georgia Islam is a 63year old male. HPI:     HPI     Diabetic Eye Exam   Diabetes characteristics include Type 2 and taking oral medications. Duration of 4 years.            Comments   Patient is in for an annual diabetic eye exam. Patient has been of Onset   • Heart Surgery Father      CABG   • Diabetes Father 64     type 1 (cause of death)   • Hypertension Father    • Lipids Father      hyperlipidemia   • Heart Disease Father      CAD   • Diabetes Mother    • Hypertension Mother    • Lipids Mother differently: 600 mg.  ) Disp: 180 capsule Rfl: 11   ACCU-CHEK BEV PLUS In Vitro Strip USE TWICE DAILY Disp: 200 strip Rfl: 0   ACCU-CHEK FASTCLIX LANCETS Does not apply Misc USE TWICE DAILY Disp: 102 each Rfl: 2   Losartan Potassium 50 MG Oral Tab Take 1 Left Right     Full Full          Extraocular Movement       Right Left     Full, Ortho Full, Ortho          Neuro/Psych     Oriented x3:  Yes    Mood/Affect:  Normal          Dilation     Both eyes:  1.0% Mydriacyl and 2.5% Jose Synephrine @ 11:29 AM vision     Myopia with astigmatism and presbyopia, bilateral  New glasses RX given to update as needed. Age-related nuclear cataract of both eyes  No treatment is required. Will continue to observe.       No orders of the defined types were placed in t

## 2017-12-16 ENCOUNTER — HOSPITAL ENCOUNTER (OUTPATIENT)
Dept: ULTRASOUND IMAGING | Facility: HOSPITAL | Age: 62
Discharge: HOME | End: 2017-12-16
Attending: INTERNAL MEDICINE
Payer: COMMERCIAL

## 2017-12-16 DIAGNOSIS — K75.81 NONALCOHOLIC STEATOHEPATITIS (NASH): ICD-10-CM

## 2017-12-16 PROCEDURE — 76705 ECHO EXAM OF ABDOMEN: CPT | Performed by: INTERNAL MEDICINE

## 2017-12-21 ENCOUNTER — PATIENT MESSAGE (OUTPATIENT)
Dept: GASTROENTEROLOGY | Facility: CLINIC | Age: 62
End: 2017-12-21

## 2017-12-21 NOTE — TELEPHONE ENCOUNTER
From: Winter Stevens  To: Feli Anthony MD  Sent: 12/21/2017 8:43 AM CST  Subject: Test Results Question    I have not received my test results for my US LIVER test on Saturday December 16 2017 .

## 2018-01-02 ENCOUNTER — TELEPHONE (OUTPATIENT)
Dept: GASTROENTEROLOGY | Facility: CLINIC | Age: 63
End: 2018-01-02

## 2018-01-02 NOTE — TELEPHONE ENCOUNTER
I sent the pt a message via [employer] about pending labs     AFP, TUMOR MARKER, SERUM   CBC WITH DIFFERENTIAL WITH PLATELET   COMP METABOLIC PANEL (14)     em

## 2018-01-03 ENCOUNTER — LAB ENCOUNTER (OUTPATIENT)
Dept: LAB | Facility: HOSPITAL | Age: 63
End: 2018-01-03
Attending: INTERNAL MEDICINE
Payer: COMMERCIAL

## 2018-01-03 DIAGNOSIS — K75.81 NONALCOHOLIC STEATOHEPATITIS (NASH): ICD-10-CM

## 2018-01-03 LAB
AFP-TM SERPL-MCNC: 5.4 NG/ML (ref 0–8.9)
ALBUMIN SERPL BCP-MCNC: 4.9 G/DL (ref 3.5–4.8)
ALBUMIN/GLOB SERPL: 2 {RATIO} (ref 1–2)
ALP SERPL-CCNC: 51 U/L (ref 32–100)
ALT SERPL-CCNC: 52 U/L (ref 17–63)
ANION GAP SERPL CALC-SCNC: 9 MMOL/L (ref 0–18)
AST SERPL-CCNC: 44 U/L (ref 15–41)
BASOPHILS # BLD: 0 K/UL (ref 0–0.2)
BASOPHILS NFR BLD: 1 %
BILIRUB SERPL-MCNC: 0.7 MG/DL (ref 0.3–1.2)
BUN SERPL-MCNC: 17 MG/DL (ref 8–20)
BUN/CREAT SERPL: 17.2 (ref 10–20)
CALCIUM SERPL-MCNC: 9.8 MG/DL (ref 8.5–10.5)
CHLORIDE SERPL-SCNC: 106 MMOL/L (ref 95–110)
CO2 SERPL-SCNC: 27 MMOL/L (ref 22–32)
CREAT SERPL-MCNC: 0.99 MG/DL (ref 0.5–1.5)
EOSINOPHIL # BLD: 0.2 K/UL (ref 0–0.7)
EOSINOPHIL NFR BLD: 3 %
ERYTHROCYTE [DISTWIDTH] IN BLOOD BY AUTOMATED COUNT: 13.8 % (ref 11–15)
GLOBULIN PLAS-MCNC: 2.5 G/DL (ref 2.5–3.7)
GLUCOSE SERPL-MCNC: 115 MG/DL (ref 70–99)
HCT VFR BLD AUTO: 43.8 % (ref 41–52)
HGB BLD-MCNC: 14.8 G/DL (ref 13.5–17.5)
LYMPHOCYTES # BLD: 1.3 K/UL (ref 1–4)
LYMPHOCYTES NFR BLD: 28 %
MCH RBC QN AUTO: 31.9 PG (ref 27–32)
MCHC RBC AUTO-ENTMCNC: 33.9 G/DL (ref 32–37)
MCV RBC AUTO: 93.9 FL (ref 80–100)
MONOCYTES # BLD: 0.4 K/UL (ref 0–1)
MONOCYTES NFR BLD: 9 %
NEUTROPHILS # BLD AUTO: 2.8 K/UL (ref 1.8–7.7)
NEUTROPHILS NFR BLD: 58 %
OSMOLALITY UR CALC.SUM OF ELEC: 296 MOSM/KG (ref 275–295)
PLATELET # BLD AUTO: 167 K/UL (ref 140–400)
PMV BLD AUTO: 9.4 FL (ref 7.4–10.3)
POTASSIUM SERPL-SCNC: 4.2 MMOL/L (ref 3.3–5.1)
PROT SERPL-MCNC: 7.4 G/DL (ref 5.9–8.4)
RBC # BLD AUTO: 4.66 M/UL (ref 4.5–5.9)
SODIUM SERPL-SCNC: 142 MMOL/L (ref 136–144)
WBC # BLD AUTO: 4.7 K/UL (ref 4–11)

## 2018-01-03 PROCEDURE — 80053 COMPREHEN METABOLIC PANEL: CPT

## 2018-01-03 PROCEDURE — 82105 ALPHA-FETOPROTEIN SERUM: CPT

## 2018-01-03 PROCEDURE — 85025 COMPLETE CBC W/AUTO DIFF WBC: CPT

## 2018-01-03 PROCEDURE — 36415 COLL VENOUS BLD VENIPUNCTURE: CPT

## 2018-01-09 ENCOUNTER — OFFICE VISIT (OUTPATIENT)
Dept: SURGERY | Facility: CLINIC | Age: 63
End: 2018-01-09

## 2018-01-09 VITALS
HEIGHT: 69 IN | TEMPERATURE: 98 F | BODY MASS INDEX: 29.62 KG/M2 | RESPIRATION RATE: 16 BRPM | WEIGHT: 200 LBS | DIASTOLIC BLOOD PRESSURE: 70 MMHG | HEART RATE: 84 BPM | SYSTOLIC BLOOD PRESSURE: 137 MMHG

## 2018-01-09 DIAGNOSIS — N40.0 BENIGN PROSTATIC HYPERPLASIA, UNSPECIFIED WHETHER LOWER URINARY TRACT SYMPTOMS PRESENT: Primary | ICD-10-CM

## 2018-01-09 PROCEDURE — 99212 OFFICE O/P EST SF 10 MIN: CPT | Performed by: UROLOGY

## 2018-01-09 PROCEDURE — 99213 OFFICE O/P EST LOW 20 MIN: CPT | Performed by: UROLOGY

## 2018-01-09 NOTE — PROGRESS NOTES
Alva Tate is a 63year old male.     HPI:   Patient presents with:  BPH: freq urination during the day       30-year-old male with a history of BPH lower urinary tract symptoms status post greenlight laser vaporization of the prostate July 11, 2017, rectum.   1997: HEMORRHOIDECTOMY  2012: HERNIA SURGERY      Comment: umbilical  [id number]: KNEE ARTHROSCOPY Right  No date: OTHER      Comment: Prostate Surgery on 7/11/2017 1994: REPAIR OF NASAL SEPTUM      Comment: nasal septoplasty  2/3/2011: Thomas Foster MG Oral Cap TAKE 1 CAPSULE BY MOUTH twice a day Disp: 120 capsule Rfl: 11   atorvastatin 80 MG Oral Tab TAKE 1 TABLET BY MOUTH EVERY NIGHT AT BEDTIME Disp: 90 tablet Rfl: 3   Terazosin HCl 5 MG Oral Cap TAKE 1 CAPSULE(5 MG) BY MOUTH EVERY NIGHT Disp: 90 ca oxybutynin ER 5 mg PO qd which we had used preoperatively to manage irritative urination symptoms. He has some additional medication at home and he will try this. He will call us back in 2-3 weeks with an update on how his symptoms have flared.   Forest Parra

## 2018-01-22 ENCOUNTER — TELEPHONE (OUTPATIENT)
Dept: GASTROENTEROLOGY | Facility: CLINIC | Age: 63
End: 2018-01-22

## 2018-01-22 RX ORDER — OXYBUTYNIN CHLORIDE 5 MG/1
5 TABLET ORAL DAILY
COMMUNITY
End: 2018-07-24 | Stop reason: ALTCHOICE

## 2018-01-22 NOTE — TELEPHONE ENCOUNTER
I spoke to Pharmacist Jude Luke and gave verbal order for Suprep (per 11/8 encounter). Copay $25. Pt notified. We also reviewed instructions, including diabetic instructions. I can see where these were reviewed at scheduling , but pt does not recall.  States u

## 2018-01-22 NOTE — TELEPHONE ENCOUNTER
Pt requesting to have prep kit sent to pharmacy for CLN scheduled for tomorrow. Pt states he is to start prep kit today at 5pm. Please call pt once script has been sent to pharmacy.  [phone] Thank you

## 2018-01-23 ENCOUNTER — HOSPITAL ENCOUNTER (OUTPATIENT)
Facility: HOSPITAL | Age: 63
Setting detail: HOSPITAL OUTPATIENT SURGERY
Discharge: HOME | End: 2018-01-23
Attending: INTERNAL MEDICINE | Admitting: INTERNAL MEDICINE
Payer: COMMERCIAL

## 2018-01-23 ENCOUNTER — SURGERY (OUTPATIENT)
Age: 63
End: 2018-01-23

## 2018-01-23 DIAGNOSIS — Z86.010 HX OF COLONIC POLYPS: ICD-10-CM

## 2018-01-23 PROCEDURE — 45385 COLONOSCOPY W/LESION REMOVAL: CPT | Performed by: INTERNAL MEDICINE

## 2018-01-23 PROCEDURE — 0DBH8ZX EXCISION OF CECUM, VIA NATURAL OR ARTIFICIAL OPENING ENDOSCOPIC, DIAGNOSTIC: ICD-10-PCS | Performed by: INTERNAL MEDICINE

## 2018-01-23 PROCEDURE — 0DBN8ZX EXCISION OF SIGMOID COLON, VIA NATURAL OR ARTIFICIAL OPENING ENDOSCOPIC, DIAGNOSTIC: ICD-10-PCS | Performed by: INTERNAL MEDICINE

## 2018-01-23 RX ORDER — MIDAZOLAM HYDROCHLORIDE 1 MG/ML
1 INJECTION INTRAMUSCULAR; INTRAVENOUS EVERY 5 MIN PRN
Status: DISCONTINUED | OUTPATIENT
Start: 2018-01-23 | End: 2018-01-23

## 2018-01-23 RX ORDER — SODIUM CHLORIDE, SODIUM LACTATE, POTASSIUM CHLORIDE, CALCIUM CHLORIDE 600; 310; 30; 20 MG/100ML; MG/100ML; MG/100ML; MG/100ML
INJECTION, SOLUTION INTRAVENOUS CONTINUOUS
Status: DISCONTINUED | OUTPATIENT
Start: 2018-01-23 | End: 2018-01-23

## 2018-01-23 RX ORDER — SODIUM CHLORIDE 0.9 % (FLUSH) 0.9 %
10 SYRINGE (ML) INJECTION AS NEEDED
Status: DISCONTINUED | OUTPATIENT
Start: 2018-01-23 | End: 2018-01-23

## 2018-01-23 NOTE — OPERATIVE REPORT
[facility] Endoscopy Report      Date of Procedure:  01/23/18      Preoperative Diagnosis:  Personal history of adenomatous colon polyp      Postoperative Diagnosis:  1. Small colon polyps  2. Sigmoid colon diverticulosis  3.   Surgical ch without complication. There were no other colonic polyps, mass lesions, vascular anomalies or signs of inflammation seen. Retroflexion in the rectum revealed slight scar deformity post PPH hemorrhoidectomy.   The procedure was reasonably well tolerated wi

## 2018-01-23 NOTE — H&P
History & Physical Examination    Patient Name: Mel Foote  MRN: [id number]  [facility]: [id number]  YOB: 1955    Diagnosis: Personal history of adenomatous colon polyps        Prescriptions Prior to Admission:  Oxybutynin Chloride 5 MG Oral 1/21/2018   aspirin (ASPIR-81) 81 MG Oral Tab EC Take  by mouth. Disp:  Rfl:  1/21/2018   Cholecalciferol (VITAMIN D3) 400 UNITS Oral Cap Take 1,000 Units by mouth daily.  Disp:  Rfl:  1/21/2018   Multiple Vitamins-Minerals (CENTRUM SILVER ULTRA MENS) Oral History:  6/3/2014: CARPAL TUNNEL RELEASE Right  2010,1011: COLONOSCOPY  2/6/2017: EGD N/A      Comment: Procedure: ESOPHAGOGASTRODUODENOSCOPY (EGD);                  Surgeon: Aleksander Dowd MD;  Location:                [address] [place] [address] ENDOSCOPY  8/16/2012: GURDEEP alternatives with the patient/family. They understand and agree to proceed with plan of care. [ x ] I have reviewed the History and Physical done within the last 30 days. Any changes noted above.     Andrews Molina MD  1/23/2018  10:02 AM

## 2018-01-24 VITALS
SYSTOLIC BLOOD PRESSURE: 126 MMHG | OXYGEN SATURATION: 97 % | WEIGHT: 205 LBS | BODY MASS INDEX: 30.36 KG/M2 | DIASTOLIC BLOOD PRESSURE: 80 MMHG | RESPIRATION RATE: 13 BRPM | HEART RATE: 71 BPM | HEIGHT: 69 IN

## 2018-01-25 ENCOUNTER — TELEPHONE (OUTPATIENT)
Dept: GASTROENTEROLOGY | Facility: CLINIC | Age: 63
End: 2018-01-25

## 2018-02-03 RX ORDER — CETIRIZINE HYDROCHLORIDE 10 MG/1
TABLET ORAL
Qty: 90 TABLET | Refills: 0 | Status: SHIPPED | OUTPATIENT
Start: 2018-02-03 | End: 2018-05-02

## 2018-02-03 RX ORDER — LOSARTAN POTASSIUM 50 MG/1
TABLET ORAL
Qty: 90 TABLET | Refills: 0 | Status: SHIPPED | OUTPATIENT
Start: 2018-02-03 | End: 2018-05-02

## 2018-03-04 ENCOUNTER — PATIENT MESSAGE (OUTPATIENT)
Dept: INTERNAL MEDICINE CLINIC | Facility: CLINIC | Age: 63
End: 2018-03-04

## 2018-03-05 NOTE — TELEPHONE ENCOUNTER
From: Keri Chen  To: Pramod Thomas MD  Sent: 3/4/2018 7:50 PM CST  Subject: Other    when should i make a appointment with Dr Remi Rodriguez.

## 2018-03-12 ENCOUNTER — TELEPHONE (OUTPATIENT)
Dept: PULMONOLOGY | Facility: CLINIC | Age: 63
End: 2018-03-12

## 2018-03-12 DIAGNOSIS — G47.33 OSA (OBSTRUCTIVE SLEEP APNEA): Primary | ICD-10-CM

## 2018-03-12 NOTE — TELEPHONE ENCOUNTER
Pt states that his current referral for CPAP supplies just . Can pt have new referral sent to [facility]? Please call.

## 2018-04-06 ENCOUNTER — OFFICE VISIT (OUTPATIENT)
Dept: INTERNAL MEDICINE CLINIC | Facility: CLINIC | Age: 63
End: 2018-04-06

## 2018-04-06 VITALS
HEART RATE: 62 BPM | BODY MASS INDEX: 31.55 KG/M2 | HEIGHT: 69 IN | WEIGHT: 213 LBS | TEMPERATURE: 98 F | DIASTOLIC BLOOD PRESSURE: 85 MMHG | SYSTOLIC BLOOD PRESSURE: 142 MMHG

## 2018-04-06 DIAGNOSIS — B07.9 VIRAL WART ON FINGER: Primary | ICD-10-CM

## 2018-04-06 DIAGNOSIS — H91.91 HEARING DIFFICULTY OF RIGHT EAR: ICD-10-CM

## 2018-04-06 PROBLEM — F17.210 CIGARETTE NICOTINE DEPENDENCE WITHOUT COMPLICATION: Status: ACTIVE | Noted: 2018-04-06

## 2018-04-06 PROCEDURE — 99212 OFFICE O/P EST SF 10 MIN: CPT | Performed by: INTERNAL MEDICINE

## 2018-04-06 PROCEDURE — 99214 OFFICE O/P EST MOD 30 MIN: CPT | Performed by: INTERNAL MEDICINE

## 2018-04-06 NOTE — PATIENT INSTRUCTIONS
Understanding Hearing Loss    As you age, some hearing loss is normal. But long-term exposure to loud noise can speed up the loss. You lose more than the ability to hear how loud a sound is. You also lose the ability to hear certain types of sounds.  For 2. Trim dead tissue with a pumice stone or other tool your healthcare provider has advised, or that you feel comfortable using. 3. Apply an over-the-counter medicine that has salicylic acid. Cover the wart with an adhesive tape.   4. Repeat every third nig

## 2018-04-06 NOTE — PROGRESS NOTES
Patient ID: Winter Stevens is a 63year old male. Patient presents with:  Ear Problem: Unable to hear from R ear x1 year  Warts: Wart on L pinky       HISTORY OF PRESENT ILLNESS:   HPI  Patient presents for above. Here with multiple issues.   Khadar paul Past Surgical History:  6/3/2014: CARPAL TUNNEL RELEASE Right  2010,1011: COLONOSCOPY  1/23/2018: COLONOSCOPY N/A      Comment: Procedure: COLONOSCOPY;  Surgeon:                Beata Daniel MD;  Location: [address] [place] [address]                ENDOSCOPY  2/6/2017: EG •  finasteride 5 MG Oral Tab, TAKE 1 TABLET(5 MG) BY MOUTH DAILY, Disp: 90 tablet, Rfl: 3  •  Terazosin HCl 5 MG Oral Cap, TAKE 1 CAPSULE(5 MG) BY MOUTH EVERY NIGHT, Disp: 90 capsule, Rfl: 3  •  Omega-3 Fatty Acids (FISH OIL TRIPLE STRENGTH) 1400 MG Oral C Years of education: N/A  Number of children: N/A     Occupational History  None on file     Social History Main Topics   Smoking status: Current Every Day Smoker  1.50 Packs/day  For 20.00 Years     Types: Cigarettes    Smokeless tobacco: Current User

## 2018-04-16 ENCOUNTER — OFFICE VISIT (OUTPATIENT)
Dept: AUDIOLOGY | Facility: CLINIC | Age: 63
End: 2018-04-16

## 2018-04-16 DIAGNOSIS — H90.3 SENSORINEURAL HEARING LOSS, BILATERAL: Primary | ICD-10-CM

## 2018-04-16 PROCEDURE — 92567 TYMPANOMETRY: CPT | Performed by: AUDIOLOGIST

## 2018-04-16 PROCEDURE — 92557 COMPREHENSIVE HEARING TEST: CPT | Performed by: AUDIOLOGIST

## 2018-04-16 NOTE — PROGRESS NOTES
AUDIOGRAM     Deepak Garza was referred for testing by Karlee Goncalves. 10/31/1955  [id number]    Pt reported history of hearing loss, greater in the right ear. 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   PT also works for the decline. Recommendations: Follow-up with Dr Bryan Birmingham;  Monitor hearing due to asymmetric hearing loss;  RTC for a hearing aid evaluation to assess communication needs, amplification options, and tinnitus management. .    Thank you for this referral.    Shine Kinney

## 2018-04-18 ENCOUNTER — TELEPHONE (OUTPATIENT)
Dept: AUDIOLOGY | Facility: CLINIC | Age: 63
End: 2018-04-18

## 2018-04-21 DIAGNOSIS — E78.00 PURE HYPERCHOLESTEROLEMIA: ICD-10-CM

## 2018-04-22 RX ORDER — FENOFIBRATE 160 MG/1
TABLET ORAL
Qty: 90 TABLET | Refills: 0 | Status: SHIPPED | OUTPATIENT
Start: 2018-04-22 | End: 2018-07-15

## 2018-04-27 ENCOUNTER — OFFICE VISIT (OUTPATIENT)
Dept: DERMATOLOGY CLINIC | Facility: CLINIC | Age: 63
End: 2018-04-27

## 2018-04-27 DIAGNOSIS — B07.8 OTHER VIRAL WARTS: Primary | ICD-10-CM

## 2018-05-03 ENCOUNTER — OFFICE VISIT (OUTPATIENT)
Dept: AUDIOLOGY | Facility: CLINIC | Age: 63
End: 2018-05-03

## 2018-05-03 DIAGNOSIS — H90.3 SENSORINEURAL HEARING LOSS, BILATERAL: Primary | ICD-10-CM

## 2018-05-03 PROCEDURE — 92591 HEARING AID EXAM, BOTH EARS: CPT | Performed by: AUDIOLOGIST

## 2018-05-03 RX ORDER — LOSARTAN POTASSIUM 50 MG/1
TABLET ORAL
Qty: 90 TABLET | Refills: 0 | Status: SHIPPED | OUTPATIENT
Start: 2018-05-03 | End: 2018-07-15

## 2018-05-03 RX ORDER — CETIRIZINE HYDROCHLORIDE 10 MG/1
TABLET ORAL
Qty: 90 TABLET | Refills: 0 | Status: SHIPPED | OUTPATIENT
Start: 2018-05-03 | End: 2018-08-07

## 2018-05-03 NOTE — TELEPHONE ENCOUNTER
Please advise on refill request. Taking once daily or bid?     Hypertensive Medications  Protocol Criteria:  · Appointment scheduled in the past 6 months or in the next 3 months  · BMP or CMP in the past 12 months  · Creatinine result < 2  Recent Outpatient Appoinment in past 12 or next 3 months     Medication refilled for 90 days per protocol.

## 2018-05-04 ENCOUNTER — TELEPHONE (OUTPATIENT)
Dept: DERMATOLOGY CLINIC | Facility: CLINIC | Age: 63
End: 2018-05-04

## 2018-05-04 NOTE — PROGRESS NOTES
Hearing Aid Evaluation    Rick Rowland  10/31/1955  [id number]    Pt describes an active lifestyle. He is on the [employer] and attends meeting frequently. 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   PT describes the TV as loud and

## 2018-05-04 NOTE — TELEPHONE ENCOUNTER
LOV 4/27/2018. Pt had wart on left small finger treated with cryotherapy. Pt c/o skin on top of blister falling off and now skin is pink. Clear drainage when blister came off. Denies pain and swelling to area.  Informed pt that blister is a normal reaction

## 2018-05-07 NOTE — PROGRESS NOTES
Keri Chen is a 63year old male. Patient presents with:  Lesion: LOV in 2014 with SD. Pt presents with raised lesion on left small finger x 6 months. Suspects wart and tried multiple OTC wart treatments which help but lesion returns. Rfl: 3   Ascorbic Acid (VITAMIN C) 100 MG Oral Tab Take  by mouth. Disp:  Rfl:    aspirin (ASPIR-81) 81 MG Oral Tab EC Take  by mouth. Disp:  Rfl:    Cholecalciferol (VITAMIN D3) 400 UNITS Oral Cap Take 1,000 Units by mouth daily.  Disp:  Rfl:    Multiple V TAKE 1 TABLET BY MOUTH AT BEDTIME Disp: 90 tablet Rfl: 3   Dicyclomine HCl 10 MG Oral Cap TAKE 1 CAPSULE BY MOUTH twice a day Disp: 120 capsule Rfl: 11   atorvastatin 80 MG Oral Tab TAKE 1 TABLET BY MOUTH EVERY NIGHT AT BEDTIME Disp: 90 tablet Rfl: 3   fin reaction(s): RED DYE  Sulfa Antibiotics       HIVES    Comment:Other reaction(s): SULFA (SULFONAMIDE ANTIBIOTICS)  Iodine [Gnp Iodine]         Comment:Other reaction(s): Rash    Past Medical History:   Diagnosis Date   • Carpal tunnel syndrome 6/3/2014 file     Social History Main Topics   Smoking status: Current Every Day Smoker  1.50 Packs/day  For 20.00 Years     Types: Cigarettes    Smokeless tobacco: Current User    Alcohol use Yes  0.0 oz/week     Comment: 2 beers once a week    Drug use: No    Sex digit palmar aspect  ASSESSMENT AND PLAN:     Other viral warts  (primary encounter diagnosis)    Assessment / plan:      Verrucae. Lesions treated with cryo- X3 cycles to all visible lesions. Total number at above sites. 1 recheck 3-4 weeks as needed.

## 2018-05-08 ENCOUNTER — OFFICE VISIT (OUTPATIENT)
Dept: SURGERY | Facility: CLINIC | Age: 63
End: 2018-05-08

## 2018-05-08 VITALS
WEIGHT: 214 LBS | HEIGHT: 69 IN | SYSTOLIC BLOOD PRESSURE: 144 MMHG | HEART RATE: 94 BPM | BODY MASS INDEX: 31.7 KG/M2 | DIASTOLIC BLOOD PRESSURE: 83 MMHG

## 2018-05-08 DIAGNOSIS — N40.0 BENIGN PROSTATIC HYPERPLASIA, UNSPECIFIED WHETHER LOWER URINARY TRACT SYMPTOMS PRESENT: Primary | ICD-10-CM

## 2018-05-08 PROCEDURE — 99212 OFFICE O/P EST SF 10 MIN: CPT | Performed by: UROLOGY

## 2018-05-08 PROCEDURE — 99213 OFFICE O/P EST LOW 20 MIN: CPT | Performed by: UROLOGY

## 2018-05-08 RX ORDER — OXYBUTYNIN CHLORIDE 5 MG/1
TABLET, EXTENDED RELEASE ORAL
Refills: 8 | COMMUNITY
Start: 2018-05-03 | End: 2018-07-24

## 2018-05-08 NOTE — PROGRESS NOTES
Oval Clifford is a 63year old male. HPI:   Patient presents with:  BPH      63year old male With BPH status post greenlight laser vaporization of the prostate performed July 11, 2017 with excellent control of symptoms.   I last saw the patient [place] left side of the rectum.   1997: HEMORRHOIDECTOMY  2012: HERNIA SURGERY      Comment: umbilical  [id number]: KNEE ARTHROSCOPY Right  No date: OTHER      Comment: Prostate Surgery on 7/11/2017 1994: REPAIR OF NASAL SEPTUM      Comment: nasal septoplast times daily before meals.  Disp: 180 tablet Rfl: 3   famotidine 40 MG Oral Tab TAKE 1 TABLET BY MOUTH AT BEDTIME Disp: 90 tablet Rfl: 3   Dicyclomine HCl 10 MG Oral Cap TAKE 1 CAPSULE BY MOUTH twice a day Disp: 120 capsule Rfl: 11   atorvastatin 80 MG Oral ANTIBIOTICS)  Iodine [Gnp Iodine]         Comment:Other reaction(s): Rash      ROS:       PHYSICAL EXAM:        ASSESSMENT/PLAN:   Assessment   Benign prostatic hyperplasia, unspecified whether lower urinary tract symptoms present  (primary encounter diagn

## 2018-05-17 ENCOUNTER — OFFICE VISIT (OUTPATIENT)
Dept: AUDIOLOGY | Facility: CLINIC | Age: 63
End: 2018-05-17

## 2018-05-17 DIAGNOSIS — H90.3 SENSORINEURAL HEARING LOSS, BILATERAL: Primary | ICD-10-CM

## 2018-05-17 PROCEDURE — V5261 HEARING AID, DIGIT, BIN, BTE: HCPCS | Performed by: AUDIOLOGIST

## 2018-05-17 NOTE — PROGRESS NOTES
Hearing Aid [facility] purchased two hearing aids as a new user.     Hearing Aid Information       Right    Left   Make: Oticon Make: Oticon   Model: OPN 2 mini RITE Model: OPN 2 mini RITE   Serial Number: [id number] Serial Number: [id number]

## 2018-05-25 NOTE — TELEPHONE ENCOUNTER
Current Outpatient Prescriptions:  gabapentin 300 MG Oral Cap TAKE 1 CAPSULE BY MOUTH TWICE DAILY (Patient taking differently: 600 mg.  ) Disp: 180 capsule Rfl: 11     Pt said pharmacy cant request-filled previously by Dr Dominique Higuera is now his

## 2018-05-25 NOTE — TELEPHONE ENCOUNTER
Called patient - verified patient's name and  - pt states he is taking med as prescribed - one 300mg tablet twice daily.

## 2018-05-26 NOTE — TELEPHONE ENCOUNTER
Rx failed protocol.  Please advise regarding refill/  Diabetes Medications  Protocol Criteria:  · Appointment scheduled in the past 6 months or the next 3 months  · A1C < 7.5 in the past 6 months  · Creatinine in the past 12 months  · Creatinine result < 1.

## 2018-05-28 RX ORDER — GABAPENTIN 300 MG/1
CAPSULE ORAL
Qty: 180 CAPSULE | Refills: 1 | Status: SHIPPED | OUTPATIENT
Start: 2018-05-28 | End: 2018-11-08

## 2018-05-31 ENCOUNTER — OFFICE VISIT (OUTPATIENT)
Dept: AUDIOLOGY | Facility: CLINIC | Age: 63
End: 2018-05-31

## 2018-05-31 DIAGNOSIS — H90.3 SENSORINEURAL HEARING LOSS, BILATERAL: Primary | ICD-10-CM

## 2018-05-31 PROCEDURE — 92593 HEARING AID CHECK, BOTH EARS: CPT | Performed by: AUDIOLOGIST

## 2018-05-31 NOTE — PROGRESS NOTES
HEARING AID FOLLOW-UP    Donna Schulz  10/31/1955  [id number]    Patient is a new hearing aid user. He has worn his hearing aids for two weeks. Pt discussed hearing new sounds such as the floor creak and birds chirping.   Obi Philip has been easier

## 2018-06-04 ENCOUNTER — TELEPHONE (OUTPATIENT)
Dept: GASTROENTEROLOGY | Facility: CLINIC | Age: 63
End: 2018-06-04

## 2018-06-04 DIAGNOSIS — K74.00 HEPATIC FIBROSIS: Primary | ICD-10-CM

## 2018-06-04 NOTE — TELEPHONE ENCOUNTER
----- Message from Marianela Reynolds RN sent at 12/22/2017  8:59 AM CST -----  Regarding: recall Bernardo Haines 5334 Liver for 6 months per .   Last  12/16/17

## 2018-06-05 NOTE — TELEPHONE ENCOUNTER
Pt contacted and reminded him that he is now due for repeat liver US and order is in place. I provided him the number to central scheduling to arrange.    Future Appointments  Date Time Provider Thalia Zuluaga   6/6/2018 9:00 AM [address] [facility] JAIRO EDWARDS

## 2018-06-06 ENCOUNTER — HOSPITAL ENCOUNTER (OUTPATIENT)
Dept: ULTRASOUND IMAGING | Facility: HOSPITAL | Age: 63
Discharge: HOME | End: 2018-06-06
Attending: INTERNAL MEDICINE
Payer: COMMERCIAL

## 2018-06-06 DIAGNOSIS — K74.00 HEPATIC FIBROSIS: ICD-10-CM

## 2018-06-06 PROCEDURE — 76705 ECHO EXAM OF ABDOMEN: CPT | Performed by: INTERNAL MEDICINE

## 2018-06-07 DIAGNOSIS — K74.00 HEPATIC FIBROSIS: Primary | ICD-10-CM

## 2018-06-14 ENCOUNTER — TELEPHONE (OUTPATIENT)
Dept: GASTROENTEROLOGY | Facility: CLINIC | Age: 63
End: 2018-06-14

## 2018-06-14 NOTE — TELEPHONE ENCOUNTER
Entered into EPIC:Recall US in 6 months per Dr. Nelson Olivares. Due 12/6/2018.  Message read by patient via [employer], \"Viewed by Tala Spangler on 6/7/2018  6:31 PM.\"

## 2018-06-14 NOTE — TELEPHONE ENCOUNTER
----- Message from Edith Christian MD sent at 6/7/2018  6:29 PM CDT -----  Please see the following My Chart message sent to the patient:    \"Mich,    Your liver ultrasound is stable and shows no suspicious findings within the liver.   A small benign

## 2018-06-22 ENCOUNTER — OFFICE VISIT (OUTPATIENT)
Dept: AUDIOLOGY | Facility: CLINIC | Age: 63
End: 2018-06-22

## 2018-06-22 DIAGNOSIS — H90.3 SENSORINEURAL HEARING LOSS, BILATERAL: Primary | ICD-10-CM

## 2018-06-22 PROCEDURE — 99070 SPECIAL SUPPLIES PHYS/QHP: CPT | Performed by: AUDIOLOGIST

## 2018-06-22 PROCEDURE — 92593 HEARING AID CHECK, BOTH EARS: CPT | Performed by: AUDIOLOGIST

## 2018-06-22 PROCEDURE — V5266 BATTERY FOR HEARING DEVICE: HCPCS | Performed by: AUDIOLOGIST

## 2018-06-22 PROCEDURE — V5267 HEARING AID SUP/ACCESS/DEV: HCPCS | Performed by: AUDIOLOGIST

## 2018-06-22 NOTE — PROGRESS NOTES
HEARING AID FOLLOW-UP    Jim Sorensen  10/31/1955  [id number]    Patient is here for 3 week follow-up as a new user. The patient has the following concerns:  Sounds are very loud especially noisy places.   Pt has found benefit using the speech in cruz

## 2018-06-24 DIAGNOSIS — N40.1 BENIGN NODULAR PROSTATIC HYPERPLASIA WITH LOWER URINARY TRACT SYMPTOMS: ICD-10-CM

## 2018-06-24 RX ORDER — FINASTERIDE 5 MG/1
TABLET, FILM COATED ORAL
Qty: 90 TABLET | Refills: 0 | Status: SHIPPED | OUTPATIENT
Start: 2018-06-24 | End: 2018-09-26

## 2018-06-24 RX ORDER — TERAZOSIN 5 MG/1
CAPSULE ORAL
Qty: 90 CAPSULE | Refills: 0 | Status: SHIPPED | OUTPATIENT
Start: 2018-06-24 | End: 2018-09-26

## 2018-07-15 DIAGNOSIS — E78.00 PURE HYPERCHOLESTEROLEMIA: ICD-10-CM

## 2018-07-15 DIAGNOSIS — E78.2 MIXED DYSLIPIDEMIA: ICD-10-CM

## 2018-07-16 RX ORDER — LOSARTAN POTASSIUM 50 MG/1
TABLET ORAL
Qty: 90 TABLET | Refills: 0 | Status: SHIPPED | OUTPATIENT
Start: 2018-07-16 | End: 2018-07-24

## 2018-07-16 RX ORDER — ATORVASTATIN CALCIUM 80 MG/1
TABLET, FILM COATED ORAL
Qty: 90 TABLET | Refills: 0 | Status: SHIPPED | OUTPATIENT
Start: 2018-07-16 | End: 2018-10-18

## 2018-07-16 RX ORDER — FENOFIBRATE 160 MG/1
TABLET ORAL
Qty: 90 TABLET | Refills: 0 | Status: SHIPPED | OUTPATIENT
Start: 2018-07-16 | End: 2018-10-18

## 2018-07-17 NOTE — TELEPHONE ENCOUNTER
Cholesterol Medications: Refilled per protocol    Protocol Criteria:  · Appointment scheduled in the past 12 months or in the next 3 months  · ALT & LDL on file in the past 12 months  · ALT result < 80  · LDL result <130   Recent Outpatient Visits Bryon Salmon    Office Visit    2 months ago Benign prostatic hyperplasia, unspecified whether lower urinary tract symptoms present    [facility] for Hoa Pandya, [address] [facility]    Office Visit    2 months ago [facility]

## 2018-07-20 ENCOUNTER — OFFICE VISIT (OUTPATIENT)
Dept: AUDIOLOGY | Facility: CLINIC | Age: 63
End: 2018-07-20

## 2018-07-20 DIAGNOSIS — H90.3 SENSORINEURAL HEARING LOSS, BILATERAL: Primary | ICD-10-CM

## 2018-07-20 PROCEDURE — 92593 HEARING AID CHECK, BOTH EARS: CPT | Performed by: AUDIOLOGIST

## 2018-07-20 NOTE — PROGRESS NOTES
HEARING AID FOLLOW-UP    Mel Foote  10/31/1955  [id number]    Patient is here for follow-up. Pt reported no problems but noted varied battery life. Discussed conditions that can affect battery life and provided pt a handout.     Increased overall g

## 2018-07-24 ENCOUNTER — LAB ENCOUNTER (OUTPATIENT)
Dept: LAB | Age: 63
End: 2018-07-24
Attending: INTERNAL MEDICINE
Payer: COMMERCIAL

## 2018-07-24 ENCOUNTER — OFFICE VISIT (OUTPATIENT)
Dept: INTERNAL MEDICINE CLINIC | Facility: CLINIC | Age: 63
End: 2018-07-24

## 2018-07-24 VITALS
DIASTOLIC BLOOD PRESSURE: 68 MMHG | WEIGHT: 211 LBS | HEIGHT: 69 IN | HEART RATE: 68 BPM | BODY MASS INDEX: 31.25 KG/M2 | SYSTOLIC BLOOD PRESSURE: 130 MMHG

## 2018-07-24 DIAGNOSIS — I10 ESSENTIAL HYPERTENSION: ICD-10-CM

## 2018-07-24 DIAGNOSIS — K74.00 HEPATIC FIBROSIS: ICD-10-CM

## 2018-07-24 DIAGNOSIS — Z12.11 COLON CANCER SCREENING: ICD-10-CM

## 2018-07-24 DIAGNOSIS — N40.1 BENIGN NODULAR PROSTATIC HYPERPLASIA WITH LOWER URINARY TRACT SYMPTOMS: ICD-10-CM

## 2018-07-24 DIAGNOSIS — E11.9 TYPE 2 DIABETES MELLITUS WITHOUT COMPLICATION, WITHOUT LONG-TERM CURRENT USE OF INSULIN (HCC): ICD-10-CM

## 2018-07-24 DIAGNOSIS — K74.60 CIRRHOSIS OF LIVER WITHOUT ASCITES, UNSPECIFIED HEPATIC CIRRHOSIS TYPE (HCC): ICD-10-CM

## 2018-07-24 DIAGNOSIS — G47.33 OSA (OBSTRUCTIVE SLEEP APNEA): ICD-10-CM

## 2018-07-24 DIAGNOSIS — Z00.00 ANNUAL PHYSICAL EXAM: ICD-10-CM

## 2018-07-24 DIAGNOSIS — E78.00 PURE HYPERCHOLESTEROLEMIA: ICD-10-CM

## 2018-07-24 DIAGNOSIS — Z00.00 ANNUAL PHYSICAL EXAM: Primary | ICD-10-CM

## 2018-07-24 DIAGNOSIS — F17.210 CIGARETTE NICOTINE DEPENDENCE WITHOUT COMPLICATION: ICD-10-CM

## 2018-07-24 DIAGNOSIS — H91.91 HEARING DIFFICULTY OF RIGHT EAR: ICD-10-CM

## 2018-07-24 LAB
ALBUMIN SERPL BCP-MCNC: 4.8 G/DL (ref 3.5–4.8)
ALBUMIN/GLOB SERPL: 1.8 {RATIO} (ref 1–2)
ALP SERPL-CCNC: 38 U/L (ref 32–100)
ALT SERPL-CCNC: 47 U/L (ref 17–63)
ANION GAP SERPL CALC-SCNC: 8 MMOL/L (ref 0–18)
AST SERPL-CCNC: 41 U/L (ref 15–41)
BASOPHILS # BLD: 0 K/UL (ref 0–0.2)
BASOPHILS NFR BLD: 1 %
BILIRUB DIRECT SERPL-MCNC: 0.1 MG/DL (ref 0–0.2)
BILIRUB SERPL-MCNC: 0.7 MG/DL (ref 0.3–1.2)
BUN SERPL-MCNC: 14 MG/DL (ref 8–20)
BUN/CREAT SERPL: 14.1 (ref 10–20)
CALCIUM SERPL-MCNC: 9.7 MG/DL (ref 8.5–10.5)
CHLORIDE SERPL-SCNC: 108 MMOL/L (ref 95–110)
CHOLEST SERPL-MCNC: 148 MG/DL (ref 110–200)
CO2 SERPL-SCNC: 25 MMOL/L (ref 22–32)
CREAT SERPL-MCNC: 0.99 MG/DL (ref 0.5–1.5)
CREAT UR-MCNC: 85.7 MG/DL
EOSINOPHIL # BLD: 0.1 K/UL (ref 0–0.7)
EOSINOPHIL NFR BLD: 3 %
ERYTHROCYTE [DISTWIDTH] IN BLOOD BY AUTOMATED COUNT: 13.8 % (ref 11–15)
GLOBULIN PLAS-MCNC: 2.6 G/DL (ref 2.5–3.7)
GLUCOSE SERPL-MCNC: 111 MG/DL (ref 70–99)
HCT VFR BLD AUTO: 41.5 % (ref 41–52)
HDLC SERPL-MCNC: 27 MG/DL
HGB BLD-MCNC: 14.1 G/DL (ref 13.5–17.5)
LDLC SERPL CALC-MCNC: 67 MG/DL (ref 0–99)
LYMPHOCYTES # BLD: 1.1 K/UL (ref 1–4)
LYMPHOCYTES NFR BLD: 28 %
MCH RBC QN AUTO: 32 PG (ref 27–32)
MCHC RBC AUTO-ENTMCNC: 34 G/DL (ref 32–37)
MCV RBC AUTO: 94.2 FL (ref 80–100)
MICROALBUMIN UR-MCNC: 3.2 MG/DL (ref 0–1.8)
MICROALBUMIN/CREAT UR: 37.3 MG/G{CREAT} (ref 0–20)
MONOCYTES # BLD: 0.4 K/UL (ref 0–1)
MONOCYTES NFR BLD: 10 %
NEUTROPHILS # BLD AUTO: 2.3 K/UL (ref 1.8–7.7)
NEUTROPHILS NFR BLD: 58 %
NONHDLC SERPL-MCNC: 121 MG/DL
OSMOLALITY UR CALC.SUM OF ELEC: 293 MOSM/KG (ref 275–295)
PATIENT FASTING: YES
PLATELET # BLD AUTO: 167 K/UL (ref 140–400)
PMV BLD AUTO: 9.6 FL (ref 7.4–10.3)
POTASSIUM SERPL-SCNC: 4.1 MMOL/L (ref 3.3–5.1)
PROT SERPL-MCNC: 7.4 G/DL (ref 5.9–8.4)
PSA SERPL-MCNC: 0.1 NG/ML (ref 0–4)
RBC # BLD AUTO: 4.4 M/UL (ref 4.5–5.9)
SODIUM SERPL-SCNC: 141 MMOL/L (ref 136–144)
TRIGL SERPL-MCNC: 270 MG/DL (ref 1–149)
TSH SERPL-ACNC: 1.12 UIU/ML (ref 0.45–5.33)
WBC # BLD AUTO: 4 K/UL (ref 4–11)

## 2018-07-24 PROCEDURE — 85025 COMPLETE CBC W/AUTO DIFF WBC: CPT

## 2018-07-24 PROCEDURE — 82105 ALPHA-FETOPROTEIN SERUM: CPT

## 2018-07-24 PROCEDURE — 84443 ASSAY THYROID STIM HORMONE: CPT

## 2018-07-24 PROCEDURE — 99396 PREV VISIT EST AGE 40-64: CPT | Performed by: INTERNAL MEDICINE

## 2018-07-24 PROCEDURE — 82043 UR ALBUMIN QUANTITATIVE: CPT

## 2018-07-24 PROCEDURE — 84153 ASSAY OF PSA TOTAL: CPT

## 2018-07-24 PROCEDURE — 80061 LIPID PANEL: CPT

## 2018-07-24 PROCEDURE — 80053 COMPREHEN METABOLIC PANEL: CPT

## 2018-07-24 PROCEDURE — 82570 ASSAY OF URINE CREATININE: CPT

## 2018-07-24 PROCEDURE — 36415 COLL VENOUS BLD VENIPUNCTURE: CPT

## 2018-07-24 PROCEDURE — 83036 HEMOGLOBIN GLYCOSYLATED A1C: CPT

## 2018-07-24 PROCEDURE — 99406 BEHAV CHNG SMOKING 3-10 MIN: CPT | Performed by: INTERNAL MEDICINE

## 2018-07-24 PROCEDURE — 99213 OFFICE O/P EST LOW 20 MIN: CPT | Performed by: INTERNAL MEDICINE

## 2018-07-24 PROCEDURE — 82248 BILIRUBIN DIRECT: CPT

## 2018-07-24 NOTE — PATIENT INSTRUCTIONS
Prevention Guidelines, Men Ages 48 to 59  Screening tests and vaccines are an important part of managing your health. Health counseling is essential, too. Below are guidelines for these, for men ages 48 to 59.  Talk with your healthcare provider to make s Prostate cancer Starting at age 39, talk to healthcare provider about risks and benefits of digital rectal exam (ELIAS) and prostate-specific antigen (PSA) screening1 At routine exams   Syphilis Men at increased risk for infection – talk with your healthcare pertussis (Td/Tdap) booster All men in this age group Td every 10 years, or a one-time dose of Tdap instead of a Td booster after age 25, then Td every 8 years   Zoster All men ages 61 and older 1 dose   Counseling Who needs it How often   Diet and exerci · American Lung Association: (863.745.8906). · [address] (638-782-1600). Support at home is important too. Nonsmokers can offer praise and encouragement. If the smoker in your life finds it hard to quit, encourage them to keep trying.   Over · \"Clearing the Air\" booklet from the [address]: smokefree.gov/sites/default/files/pdf/clearing-the-air-accessible. pdf  Date Last Reviewed: 3/1/2017  © 4379-6293 The [address]. [address] [facility], [facility], [address] [facility].  All ri Ok: Milk, chocolate milk, hot chocolate mix, low-salt cheeses, and yogurt  Avoid: Processed cheese and cheese spreads;  Roquefort, Camembert, and cottage cheese; buttermilk, instant breakfast drink  Desserts  Ok: Ice cream, frozen yogurt, juice bars, gelati © 4527-5269 The [address]. 1407 [facility], [facility] [place]. All rights reserved. This information is not intended as a substitute for professional medical care. Always follow your healthcare professional's instructions.         Diet: D · Eat less fat to help lower your risk of heart disease. Use nonfat or low-fat dairy products and lean meats. Avoid fried foods. Use cooking oils that are unsaturated, such as olive, canola, or peanut oil.   · Talk with your dietitian about safe sugar subst

## 2018-07-24 NOTE — PROGRESS NOTES
Patient ID: Agustina Guevara is a 63year old male. Patient presents with:  Physical       HISTORY OF PRESENT ILLNESS:   HPI  Patient presents for above. Here for annual physical and discuss multiple medical issues.     Patient with obstructive sleep apn Musculoskeletal: Positive for arthralgias. Skin: Negative. Allergic/Immunologic: Negative. Neurological: Negative. Hematological: Negative. Psychiatric/Behavioral: Negative.       MEDICAL HISTORY:     Past Medical History:   Diagnosis Date tablet, Rfl: 0  •  ATORVASTATIN 80 MG Oral Tab, TAKE 1 TABLET BY MOUTH EVERY NIGHT AT BEDTIME, Disp: 90 tablet, Rfl: 0  •  TERAZOSIN HCL 5 MG Oral Cap, TAKE 1 CAPSULE(5 MG) BY MOUTH EVERY NIGHT, Disp: 90 capsule, Rfl: 0  •  FINASTERIDE 5 MG Oral Tab, TAKE MONITOR) W/DEVICE Does not apply Kit, , Disp: , Rfl:     Allergies:  Codeine                 NAUSEA AND VOMITING  Doxycycline             UNKNOWN  Lisinopril              Coughing  Red Dye                 HIVES    Comment:Other reaction(s): RED DYE  Sulfa months ago. Musculoskeletal: Normal range of motion. Neurological: He is alert and oriented to person, place, and time. Skin: Skin is warm. Psychiatric: He has a normal mood and affect. ASSESSMENT/PLAN:   1.  Annual physical exam  · [facility] [place]

## 2018-07-25 LAB
AFP-TM SERPL-MCNC: 5 NG/ML (ref 0–8.9)
HBA1C MFR BLD: 6.2 % (ref 4–6)

## 2018-08-02 ENCOUNTER — PATIENT MESSAGE (OUTPATIENT)
Dept: INTERNAL MEDICINE CLINIC | Facility: CLINIC | Age: 63
End: 2018-08-02

## 2018-08-02 NOTE — TELEPHONE ENCOUNTER
From: Rick Rowland  To: Nilda Gregorio MD  Sent: 8/2/2018 9:50 AM CDT  Subject: Other    I just looked at my billing on [address] I'm trying to figure out why I would have to pay $20.00 . There is no explanation.  When I saw Dr Jung Bhandari I was told I didn't

## 2018-08-06 ENCOUNTER — TELEPHONE (OUTPATIENT)
Dept: GASTROENTEROLOGY | Facility: CLINIC | Age: 63
End: 2018-08-06

## 2018-08-06 NOTE — TELEPHONE ENCOUNTER
Entered into EPIC:Recall US liver and labs in 6 months per Dr. Inna Mars. Next due for US liver and labs 1/2019.

## 2018-08-06 NOTE — TELEPHONE ENCOUNTER
----- Message from Lou Saleem MD sent at 8/1/2018  7:47 PM CDT -----  Please see the following My Chart message sent to the patient:    \"Mich,    Your blood work looks great.   Liver enzymes are normal.    I would repeat both the blood tests and

## 2018-08-08 RX ORDER — CETIRIZINE HYDROCHLORIDE 10 MG/1
TABLET ORAL
Qty: 90 TABLET | Refills: 0 | Status: SHIPPED | OUTPATIENT
Start: 2018-08-08 | End: 2018-11-01

## 2018-08-08 NOTE — TELEPHONE ENCOUNTER
Refill Protocol Appointment Criteria  · Appointment scheduled in the past 12 months or in the next 3 months  Recent Outpatient Visits            2 weeks ago Annual physical exam    Carolann Myles MD    Office Visit

## 2018-08-21 RX ORDER — FAMOTIDINE 40 MG/1
TABLET, FILM COATED ORAL
Qty: 90 TABLET | Refills: 0 | Status: SHIPPED | OUTPATIENT
Start: 2018-08-21 | End: 2018-11-07

## 2018-08-21 NOTE — TELEPHONE ENCOUNTER
Refill Protocol Appointment Criteria  · Appointment scheduled in the past 12 months or in the next 3 months  Recent Outpatient Visits            4 weeks ago Annual physical exam    Giancarlo Hodge MD    Office Visit

## 2018-08-22 ENCOUNTER — AUDIOLOGY DOCUMENTATION (OUTPATIENT)
Dept: AUDIOLOGY | Facility: CLINIC | Age: 63
End: 2018-08-22

## 2018-08-22 NOTE — TELEPHONE ENCOUNTER
Diabetes Medications  Protocol Criteria:  · Appointment scheduled in the past 6 months or the next 3 months  · A1C < 7.5 in the past 6 months  · Creatinine in the past 12 months  · Creatinine result < 1.5   Recent Outpatient Visits            4 weeks ago A

## 2018-08-22 NOTE — PROGRESS NOTES
Patient walked in and told  hearing aids were not working.  offered for patient to drop off as audiologist was busy. He refused. Offered for him to wait and I would look at hearing aids as soon as possible. He again declined.    Patient bought

## 2018-08-27 DIAGNOSIS — K21.9 GASTROESOPHAGEAL REFLUX DISEASE WITHOUT ESOPHAGITIS: ICD-10-CM

## 2018-08-29 RX ORDER — DICYCLOMINE HYDROCHLORIDE 10 MG/1
CAPSULE ORAL
Qty: 120 CAPSULE | Refills: 0 | Status: SHIPPED | OUTPATIENT
Start: 2018-08-29 | End: 2018-11-01

## 2018-08-29 NOTE — TELEPHONE ENCOUNTER
Please advise in regards to refill request. Thank You    Refill Protocol Appointment Criteria  · Appointment scheduled in the past 12 months or in the next 3 months  Recent Outpatient Visits            1 month ago Annual physical exam    [facility], [facility], S

## 2018-09-07 ENCOUNTER — TELEPHONE (OUTPATIENT)
Dept: AUDIOLOGY | Facility: CLINIC | Age: 63
End: 2018-09-07

## 2018-09-07 NOTE — TELEPHONE ENCOUNTER
Spoke with patient and trouble-shooted extensively over the phone. He has changed batteries and wax guards and both aids are dead.   Verified several times with him that he took off dome and changed wax guard and is using batteries he got from our office

## 2018-09-07 NOTE — TELEPHONE ENCOUNTER
pt called. He states his hearing aid is not working well. I advised he should bring them to the office for an audiologist to look at.  pt states he lives in [place] [facility] [place] and is a long ride. He is asking to have Dr. Antonette Bowman call him.   He is hoping you can help

## 2018-09-10 ENCOUNTER — OFFICE VISIT (OUTPATIENT)
Dept: AUDIOLOGY | Facility: CLINIC | Age: 63
End: 2018-09-10

## 2018-09-10 DIAGNOSIS — H90.3 SENSORINEURAL HEARING LOSS, BILATERAL: Primary | ICD-10-CM

## 2018-09-10 PROCEDURE — 92593 HEARING AID CHECK, BOTH EARS: CPT | Performed by: AUDIOLOGIST

## 2018-09-10 NOTE — PROGRESS NOTES
HEARING AID FOLLOW-UP    Patrecia Levels  10/31/1955  [id number]        Hearing Aid Information       Right    Left   Make: Oticon Make: Oticon   Model: OPN 2 mini RITE Model: OPN 2 mini RITE   Serial Number: [id number] Serial Number: [id number]   Date of Pur

## 2018-09-10 NOTE — TELEPHONE ENCOUNTER
Patient dropped off both aids and picked up loaners at the ,  Oticon Opn 1 mini rite  Right serial number: [id number]  Left serial number: [id number]    Action taken  Cleaned aids  Suctioned vishal/rec/ports  Cleaned battery contacts  Cleaned battery do

## 2018-09-25 ENCOUNTER — TELEPHONE (OUTPATIENT)
Dept: INTERNAL MEDICINE CLINIC | Facility: CLINIC | Age: 63
End: 2018-09-25

## 2018-09-25 DIAGNOSIS — G47.33 OSA (OBSTRUCTIVE SLEEP APNEA): Primary | ICD-10-CM

## 2018-09-25 NOTE — TELEPHONE ENCOUNTER
Dr Naty Hinojosa, [facility] faxed a request for pt's new CPAP supplies. Please sign referral if you agree.  Thanks, KEVEN, [facility]

## 2018-09-26 DIAGNOSIS — N40.1 BENIGN NODULAR PROSTATIC HYPERPLASIA WITH LOWER URINARY TRACT SYMPTOMS: ICD-10-CM

## 2018-09-27 RX ORDER — PANTOPRAZOLE SODIUM 40 MG/1
TABLET, DELAYED RELEASE ORAL
Qty: 180 TABLET | Refills: 0 | Status: SHIPPED | OUTPATIENT
Start: 2018-09-27 | End: 2018-12-30

## 2018-09-27 RX ORDER — FINASTERIDE 5 MG/1
TABLET, FILM COATED ORAL
Qty: 90 TABLET | Refills: 0 | Status: SHIPPED | OUTPATIENT
Start: 2018-09-27 | End: 2018-12-21

## 2018-09-27 RX ORDER — TERAZOSIN 5 MG/1
CAPSULE ORAL
Qty: 90 CAPSULE | Refills: 0 | Status: SHIPPED | OUTPATIENT
Start: 2018-09-27 | End: 2018-12-21

## 2018-09-27 NOTE — TELEPHONE ENCOUNTER
Refill Protocol Appointment Criteria  · Appointment scheduled in the past 6 months or in the next 3 months  Recent Outpatient Visits            2 weeks ago Sensorineural hearing loss, bilateral    [facility] Martha Pandya,

## 2018-09-27 NOTE — TELEPHONE ENCOUNTER
Requested Prescriptions     Pending Prescriptions Disp Refills   • PANTOPRAZOLE SODIUM 40 MG Oral Tab EC [Pharmacy Med Name: PANTOPRAZOLE 40MG TABLETS] 180 tablet 0     Sig: TAKE 1 TABLET(40 MG) BY MOUTH TWICE DAILY BEFORE MEALS     Last seen CLN 1/23/18

## 2018-10-10 ENCOUNTER — OFFICE VISIT (OUTPATIENT)
Dept: INTERNAL MEDICINE CLINIC | Facility: CLINIC | Age: 63
End: 2018-10-10

## 2018-10-10 ENCOUNTER — HOSPITAL ENCOUNTER (OUTPATIENT)
Dept: GENERAL RADIOLOGY | Facility: HOSPITAL | Age: 63
Discharge: HOME | End: 2018-10-10
Attending: INTERNAL MEDICINE
Payer: COMMERCIAL

## 2018-10-10 VITALS
HEART RATE: 76 BPM | WEIGHT: 215 LBS | HEIGHT: 69 IN | DIASTOLIC BLOOD PRESSURE: 77 MMHG | TEMPERATURE: 98 F | RESPIRATION RATE: 16 BRPM | BODY MASS INDEX: 31.84 KG/M2 | SYSTOLIC BLOOD PRESSURE: 138 MMHG

## 2018-10-10 DIAGNOSIS — M79.605 LEG PAIN, ANTERIOR, LEFT: Primary | ICD-10-CM

## 2018-10-10 DIAGNOSIS — M79.605 LEG PAIN, ANTERIOR, LEFT: ICD-10-CM

## 2018-10-10 PROCEDURE — 99213 OFFICE O/P EST LOW 20 MIN: CPT | Performed by: INTERNAL MEDICINE

## 2018-10-10 PROCEDURE — 73564 X-RAY EXAM KNEE 4 OR MORE: CPT | Performed by: INTERNAL MEDICINE

## 2018-10-10 PROCEDURE — 99212 OFFICE O/P EST SF 10 MIN: CPT | Performed by: INTERNAL MEDICINE

## 2018-10-10 NOTE — PROGRESS NOTES
Patient ID: Shellee Primrose is a 63year old male. Patient presents with:  Leg Pain: Patient c/o left leg pain from knee down       HISTORY OF PRESENT ILLNESS:   HPI  Patient resents for above. Here with left lower extremity pain for the past 4 days. 6/3/2014   • COLONOSCOPY  2010,1011   • COLONOSCOPY N/A 1/23/2018    Procedure: COLONOSCOPY;  Surgeon: Kendall Cook MD;  Location: [address] [place] [address] ENDOSCOPY   • COLONOSCOPY N/A 1/23/2018    Performed by Kendall Cook MD at [facility] Disp: 360 tablet, Rfl: 0  •  CETIRIZINE 10 MG Oral Tab, TAKE 1 TABLET(10 MG) BY MOUTH EVERY EVENING, Disp: 90 tablet, Rfl: 0  •  FENOFIBRATE 160 MG Oral Tab, TAKE 1 TABLET(160 MG) BY MOUTH EVERY DAY, Disp: 90 tablet, Rfl: 0  •  ATORVASTATIN 80 MG Oral Tab, History    Socioeconomic History      Marital status:       Spouse name: Not on file      Number of children: Not on file      Years of education: Not on file      Highest education level: Not on file    Social Needs      Financial resource strain: regular rhythm and normal heart sounds. Pulmonary/Chest: Effort normal and breath sounds normal. No respiratory distress. Musculoskeletal:        Left knee: He exhibits normal range of motion, no swelling, no effusion, no ecchymosis and no deformity.  Anneliese Clifford

## 2018-10-11 ENCOUNTER — TELEPHONE (OUTPATIENT)
Dept: INTERNAL MEDICINE CLINIC | Facility: CLINIC | Age: 63
End: 2018-10-11

## 2018-10-16 ENCOUNTER — OFFICE VISIT (OUTPATIENT)
Dept: INTERNAL MEDICINE CLINIC | Facility: CLINIC | Age: 63
End: 2018-10-16

## 2018-10-16 VITALS
DIASTOLIC BLOOD PRESSURE: 81 MMHG | WEIGHT: 219 LBS | HEART RATE: 71 BPM | RESPIRATION RATE: 17 BRPM | HEIGHT: 69 IN | TEMPERATURE: 98 F | SYSTOLIC BLOOD PRESSURE: 133 MMHG | BODY MASS INDEX: 32.44 KG/M2

## 2018-10-16 DIAGNOSIS — M79.605 LEG PAIN, ANTERIOR, LEFT: Primary | ICD-10-CM

## 2018-10-16 DIAGNOSIS — Z23 NEED FOR VACCINATION: ICD-10-CM

## 2018-10-16 PROCEDURE — 99213 OFFICE O/P EST LOW 20 MIN: CPT | Performed by: INTERNAL MEDICINE

## 2018-10-16 PROCEDURE — 99212 OFFICE O/P EST SF 10 MIN: CPT | Performed by: INTERNAL MEDICINE

## 2018-10-16 PROCEDURE — 90471 IMMUNIZATION ADMIN: CPT | Performed by: INTERNAL MEDICINE

## 2018-10-16 PROCEDURE — 90686 IIV4 VACC NO PRSV 0.5 ML IM: CPT | Performed by: INTERNAL MEDICINE

## 2018-10-16 RX ORDER — ATORVASTATIN CALCIUM 80 MG/1
TABLET, FILM COATED ORAL
COMMUNITY
End: 2018-10-16

## 2018-10-16 RX ORDER — FENOFIBRATE 160 MG/1
160 TABLET ORAL
COMMUNITY
End: 2018-10-16

## 2018-10-16 RX ORDER — PANTOPRAZOLE SODIUM 40 MG/1
40 TABLET, DELAYED RELEASE ORAL
COMMUNITY
End: 2018-10-16

## 2018-10-16 RX ORDER — ASPIRIN 81 MG/1
81 TABLET, CHEWABLE ORAL
COMMUNITY
End: 2018-10-16

## 2018-10-16 NOTE — PROGRESS NOTES
Patient ID: Tala Spangler is a 63year old male. Patient presents with:  Leg Pain: Left leg  and flu shot. HISTORY OF PRESENT ILLNESS:   HPI  Patient presents for above. Here for follow-up of left knee pain.   Pain significantly improved but per Dione Shaver MD;  Location: [facility] ENDOSCOPY   • ELECTROCARDIOGRAM, COMPLETE  8/16/2012   • ESOPHAGOGASTRODUODENOSCOPY (EGD) N/A 2/6/2017    Performed by Dione Shaver MD at [address] [place] [address] N/A 10/31/2017    Performed Disp: 180 capsule, Rfl: 1  •  METFORMIN  MG Oral Tab, TAKE 4 TABLETS BY MOUTH DAILY AT DINNER, Disp: 360 tablet, Rfl: 0  •  Omega-3 Fatty Acids (FISH OIL TRIPLE STRENGTH) 1400 MG Oral Cap, Take by mouth BID (Nitrates). , Disp: , Rfl:   •  ACCU-CHEK A on file      Transportation needs - non-medical: Not on file    Occupational History      Not on file    Tobacco Use      Smoking status: Current Every Day Smoker        Packs/day: 1.50        Years: 20.00        Pack years: 30        Types: Cigarettes alert.   Psychiatric: He has a normal mood and affect. ASSESSMENT/PLAN:   1. Leg pain, anterior, left  · Told to purchase knee brace for when working. · Warm compresses. · We will hold off on physical therapy. · May need MRI if persists.   · Over

## 2018-10-16 NOTE — PATIENT INSTRUCTIONS
1.  Wear knee brace as we discussed. Knee Sprain    A sprain is an injury to the ligaments or capsule that holds a joint together. There are no broken bones.  Most sprains take 3 to 6 weeks to heal. If it a severe sprain where the ligament is completely to kidney disease or ever had a stomach ulcer or GI bleeding, talk with your healthcare provider before using these medicines. · If you were given a splint, keep it completely dry at all times.  Bathe with your splint out of the water, protected with 2 large

## 2018-10-18 DIAGNOSIS — E78.2 MIXED DYSLIPIDEMIA: ICD-10-CM

## 2018-10-18 DIAGNOSIS — E78.00 PURE HYPERCHOLESTEROLEMIA: ICD-10-CM

## 2018-10-19 RX ORDER — FENOFIBRATE 160 MG/1
TABLET ORAL
Qty: 90 TABLET | Refills: 0 | Status: SHIPPED | OUTPATIENT
Start: 2018-10-19 | End: 2019-01-15

## 2018-10-19 RX ORDER — ATORVASTATIN CALCIUM 80 MG/1
TABLET, FILM COATED ORAL
Qty: 90 TABLET | Refills: 0 | Status: SHIPPED | OUTPATIENT
Start: 2018-10-19 | End: 2019-01-23

## 2018-10-19 NOTE — TELEPHONE ENCOUNTER
Cholesterol Medications  Protocol Criteria:  · Appointment scheduled in the past 12 months or in the next 3 months  · ALT & LDL on file in the past 12 months  · ALT result < 80  · LDL result <130   Recent Outpatient Visits            3 days ago Leg pain, a

## 2018-11-01 DIAGNOSIS — K21.9 GASTROESOPHAGEAL REFLUX DISEASE WITHOUT ESOPHAGITIS: ICD-10-CM

## 2018-11-01 RX ORDER — DICYCLOMINE HYDROCHLORIDE 10 MG/1
CAPSULE ORAL
Qty: 180 CAPSULE | Refills: 0 | Status: SHIPPED | OUTPATIENT
Start: 2018-11-01 | End: 2019-01-23

## 2018-11-01 RX ORDER — CETIRIZINE HYDROCHLORIDE 10 MG/1
TABLET ORAL
Qty: 90 TABLET | Refills: 0 | Status: SHIPPED | OUTPATIENT
Start: 2018-11-01 | End: 2019-02-06

## 2018-11-02 ENCOUNTER — TELEPHONE (OUTPATIENT)
Dept: AUDIOLOGY | Facility: CLINIC | Age: 63
End: 2018-11-02

## 2018-11-02 NOTE — TELEPHONE ENCOUNTER
Refill passed per [facility] protocol.     Refill Protocol Appointment Criteria  · Appointment scheduled in the past 6 months or in the next 3 months  Recent Outpatient Visits            2 weeks ago Leg pain, anterior, left    [facility], [facility], Carolyn Mortensen

## 2018-11-02 NOTE — TELEPHONE ENCOUNTER
Pt. States that he received a ltr., for [facility] and wants to know if he is able to drop his hearing aid at  to get it checked and get a loaner pair of hearing aids, or should pt. Just sched appt. For [facility] in office?

## 2018-11-02 NOTE — TELEPHONE ENCOUNTER
Triage RN=please call patient regarding Losartan, per our record it was discontinued on 7/24/18,if ever he is still taking it, clarify the dose and frequency(2016 says taking differently). Then follow the protocol.

## 2018-11-05 ENCOUNTER — PATIENT MESSAGE (OUTPATIENT)
Dept: PULMONOLOGY | Facility: CLINIC | Age: 63
End: 2018-11-05

## 2018-11-06 RX ORDER — LOSARTAN POTASSIUM 50 MG/1
TABLET ORAL
Qty: 90 TABLET | Refills: 0 | Status: SHIPPED | OUTPATIENT
Start: 2018-11-06 | End: 2019-01-23

## 2018-11-06 NOTE — TELEPHONE ENCOUNTER
Called patient, he's not at home, but he will check and either call back or send a message on [employer] about whether he's taking the losartan and if he is, the dose and frequency.

## 2018-11-06 NOTE — TELEPHONE ENCOUNTER
Hypertensive Medications  Protocol Criteria:  · Appointment scheduled in the past 6 months or in the next 3 months  · BMP or CMP in the past 12 months  · Creatinine result < 2  Recent Outpatient Visits            3 weeks ago Leg pain, anterior, left    [facility]

## 2018-11-07 NOTE — PROGRESS NOTES
Parag Garcia from [address] [place] [address] CPAP supplies were mailed out to pt on 11/5/18 and should be arriving to pt soon. Parag Garcia states the supplies were shipped from [facility].

## 2018-11-07 NOTE — TELEPHONE ENCOUNTER
From: Dimitry Mazariegos  To: Elvis Fernandez MD  Sent: 11/5/2018 3:40 PM CST  Subject: Other    I am having problems with ,[facility] with receiving my supplies .  when i order my supplies ,i do not receive them in a timely manner , i ordered them on [place]

## 2018-11-08 RX ORDER — FAMOTIDINE 40 MG/1
TABLET, FILM COATED ORAL
Qty: 90 TABLET | Refills: 0 | Status: SHIPPED | OUTPATIENT
Start: 2018-11-08 | End: 2019-02-17

## 2018-11-08 RX ORDER — GABAPENTIN 300 MG/1
CAPSULE ORAL
Qty: 180 CAPSULE | Refills: 0 | Status: SHIPPED | OUTPATIENT
Start: 2018-11-08 | End: 2019-02-06

## 2018-11-09 NOTE — TELEPHONE ENCOUNTER
Refill passed per [facility] protocol.   Refill Protocol Appointment Criteria  · Appointment scheduled in the past 6 months or in the next 3 months  Recent Outpatient Visits            3 weeks ago Leg pain, anterior, left    [facility], [facility], Sheng Broussard

## 2018-11-16 ENCOUNTER — OFFICE VISIT (OUTPATIENT)
Dept: AUDIOLOGY | Facility: CLINIC | Age: 63
End: 2018-11-16

## 2018-11-16 DIAGNOSIS — H90.3 SENSORINEURAL HEARING LOSS, BILATERAL: Primary | ICD-10-CM

## 2018-11-16 PROCEDURE — 92593 HEARING AID CHECK, BOTH EARS: CPT | Performed by: AUDIOLOGIST

## 2018-11-16 NOTE — PROGRESS NOTES
HEARING AID FOLLOW-UP    Georgia Sarmiento  10/31/1955  [id number]    Patient is here for his last in-service visit. The patient had no specific concerns.     In office the following actions were taken:  Cleaned aids  Suctioned microphone ports  Suction

## 2018-12-06 ENCOUNTER — OFFICE VISIT (OUTPATIENT)
Dept: OPTOMETRY | Facility: CLINIC | Age: 63
End: 2018-12-06

## 2018-12-06 DIAGNOSIS — H25.13 AGE-RELATED NUCLEAR CATARACT OF BOTH EYES: ICD-10-CM

## 2018-12-06 DIAGNOSIS — E11.9 TYPE 2 DIABETES MELLITUS WITHOUT COMPLICATION, WITHOUT LONG-TERM CURRENT USE OF INSULIN (HCC): Primary | ICD-10-CM

## 2018-12-06 PROCEDURE — 92014 COMPRE OPH EXAM EST PT 1/>: CPT | Performed by: OPTOMETRIST

## 2018-12-06 NOTE — PROGRESS NOTES
Jatin Perales is a 66year old male. HPI:     HPI     Diabetic Eye Exam     Diabetes characteristics include Type 2, controlled with diet and taking oral medications. Duration of 5 years. Number of years diabetic 5. Number of years on pills 5.   Nu on 7/11/2017); and colonoscopy (N/A, 1/23/2018) (Procedure: COLONOSCOPY;  Surgeon: Elaine León MD;  Location: [facility]).     Family History   Problem Relation Age of Onset   • Heart Surgery Father         CABG   • Diabetes Father 64        ty 180 tablet Rfl: 0   METFORMIN  MG Oral Tab TAKE 4 TABLETS BY MOUTH DAILY AT DINNER Disp: 360 tablet Rfl: 0   Omega-3 Fatty Acids (FISH OIL TRIPLE STRENGTH) 1400 MG Oral Cap Take by mouth BID (Nitrates).  Disp:  Rfl:    ACCU-CHEK BEV PLUS In Vitro S (Non-contact air puff, 9:57 AM)       Right Left    Pressure 15 14          Pupils       Pupils    Right PERRL    Left PERRL          Visual Fields       Left Right     Full Full          Extraocular Movement       Right Left     Full, Ortho Full, Ortho physician as directed. I stressed the importance of yearly diabetic eye exams. Patient has been advised to call immediately if they notice any changes or problems with their vision     Age-related nuclear cataract of both eyes  No treatment is required.  Wi

## 2018-12-10 ENCOUNTER — TELEPHONE (OUTPATIENT)
Dept: GASTROENTEROLOGY | Facility: CLINIC | Age: 63
End: 2018-12-10

## 2018-12-10 DIAGNOSIS — K74.60 CIRRHOSIS OF LIVER WITHOUT ASCITES, UNSPECIFIED HEPATIC CIRRHOSIS TYPE (HCC): Primary | ICD-10-CM

## 2018-12-10 NOTE — TELEPHONE ENCOUNTER
----- Message from Aquiles Judge RN sent at 2018  1:23 PM CDT -----  Regardin month US recall  Entered into EPIC:Recall US in 6 months per Dr. Abbey Rojas. Due 2018.

## 2018-12-11 ENCOUNTER — PATIENT MESSAGE (OUTPATIENT)
Dept: INTERNAL MEDICINE CLINIC | Facility: CLINIC | Age: 63
End: 2018-12-11

## 2018-12-11 DIAGNOSIS — K74.60 CIRRHOSIS OF LIVER WITHOUT ASCITES, UNSPECIFIED HEPATIC CIRRHOSIS TYPE (HCC): Primary | ICD-10-CM

## 2018-12-11 NOTE — TELEPHONE ENCOUNTER
Patient contacted and message from Dr. Ellen Rocha given. Phone number for central scheduling provided and advised to schedule in the next 2 weeks incase a prior auth is needed. Patient agreed.

## 2018-12-11 NOTE — TELEPHONE ENCOUNTER
From: Stacie Bhatti  To: Saskia Narvaez MD  Sent: 12/11/2018 11:11 AM CST  Subject: Referral Request    I would like to get a referral to , Dr Willie Bosch. Need by , January 14 2019 .  Thank you Redd Laird 81- [id number]

## 2018-12-11 NOTE — TELEPHONE ENCOUNTER
Orders entered for blood work and the liver ultrasound. The patient should also be seen in the office in follow-up in the first quarter of 2019.

## 2018-12-12 NOTE — TELEPHONE ENCOUNTER
Received follow up from [facility] Sergio SANDHU from GI Dept, patient is following up with Dr. Bong Rodríguez for his liver cirrhosis. Has an US scheduled by office but Dr Bong Rodríguez also wants an office follow up, is now due.  A referral is needed to schedule the office

## 2018-12-20 ENCOUNTER — LAB ENCOUNTER (OUTPATIENT)
Dept: LAB | Facility: HOSPITAL | Age: 63
End: 2018-12-20
Attending: INTERNAL MEDICINE
Payer: COMMERCIAL

## 2018-12-20 ENCOUNTER — HOSPITAL ENCOUNTER (OUTPATIENT)
Dept: ULTRASOUND IMAGING | Facility: HOSPITAL | Age: 63
Discharge: HOME | End: 2018-12-20
Attending: INTERNAL MEDICINE
Payer: COMMERCIAL

## 2018-12-20 DIAGNOSIS — K74.60 CIRRHOSIS OF LIVER WITHOUT ASCITES, UNSPECIFIED HEPATIC CIRRHOSIS TYPE (HCC): ICD-10-CM

## 2018-12-20 PROCEDURE — 76705 ECHO EXAM OF ABDOMEN: CPT | Performed by: INTERNAL MEDICINE

## 2018-12-20 PROCEDURE — 80053 COMPREHEN METABOLIC PANEL: CPT

## 2018-12-20 PROCEDURE — 82105 ALPHA-FETOPROTEIN SERUM: CPT

## 2018-12-20 PROCEDURE — 36415 COLL VENOUS BLD VENIPUNCTURE: CPT

## 2018-12-20 PROCEDURE — 85025 COMPLETE CBC W/AUTO DIFF WBC: CPT

## 2018-12-21 DIAGNOSIS — N40.1 BENIGN NODULAR PROSTATIC HYPERPLASIA WITH LOWER URINARY TRACT SYMPTOMS: ICD-10-CM

## 2018-12-21 RX ORDER — TERAZOSIN 5 MG/1
CAPSULE ORAL
Qty: 90 CAPSULE | Refills: 0 | Status: SHIPPED | OUTPATIENT
Start: 2018-12-21 | End: 2019-03-31

## 2018-12-21 RX ORDER — FINASTERIDE 5 MG/1
TABLET, FILM COATED ORAL
Qty: 90 TABLET | Refills: 0 | Status: SHIPPED | OUTPATIENT
Start: 2018-12-21 | End: 2019-04-02

## 2018-12-22 NOTE — TELEPHONE ENCOUNTER
Requested Prescriptions     Pending Prescriptions Disp Refills   • FINASTERIDE 5 MG Oral Tab [Pharmacy Med Name: FINASTERIDE 5MG TABLETS] 90 tablet 0     Sig: TAKE 1 TABLET(5 MG) BY MOUTH DAILY   • TERAZOSIN HCL 5 MG Oral Cap [Pharmacy Med Name: TERAZOSIN

## 2018-12-24 ENCOUNTER — TELEPHONE (OUTPATIENT)
Dept: GASTROENTEROLOGY | Facility: CLINIC | Age: 63
End: 2018-12-24

## 2018-12-24 NOTE — TELEPHONE ENCOUNTER
----- Message from Brody Arango MD sent at 12/22/2018 11:14 AM CST -----  Please see the following My Chart message sent to the patient:    Ulysses Inoue,    Happy holidays to you and your family! Your blood work looks good.   All of your liver enzymes

## 2018-12-31 RX ORDER — PANTOPRAZOLE SODIUM 40 MG/1
TABLET, DELAYED RELEASE ORAL
Qty: 180 TABLET | Refills: 3 | Status: SHIPPED | OUTPATIENT
Start: 2018-12-31 | End: 2019-03-02

## 2018-12-31 NOTE — TELEPHONE ENCOUNTER
Requested Prescriptions     Pending Prescriptions Disp Refills   • PANTOPRAZOLE SODIUM 40 MG Oral Tab EC [Pharmacy Med Name: PANTOPRAZOLE 40MG TABLETS] 180 tablet 0     Sig: TAKE 1 TABLET(40 MG) BY MOUTH TWICE DAILY BEFORE MEALS     lov-1/23/18 colon  [id number]

## 2019-01-09 ENCOUNTER — TELEPHONE (OUTPATIENT)
Dept: INTERNAL MEDICINE CLINIC | Facility: CLINIC | Age: 64
End: 2019-01-09

## 2019-01-09 NOTE — TELEPHONE ENCOUNTER
Advised to call triage on [employer]    Regarding: Other  Contact: [phone]  ----- Message from Mychart Generic sent at 1/9/2019 11:05 AM CST -----    I believe I have, food poisoning, up all night diarrhea, throwing up, not sure if I should take my medic

## 2019-01-09 NOTE — TELEPHONE ENCOUNTER
Spoke with patient for Juliocesar message below--he reports he and his wife ate a [place] in [email or web address] yesterday--\"she is fine, but I think I threw up and had diarrhea about a dozen times since then.  I think I got most everything out, but I feel gassy and my kn

## 2019-01-14 ENCOUNTER — TELEPHONE (OUTPATIENT)
Dept: GASTROENTEROLOGY | Facility: CLINIC | Age: 64
End: 2019-01-14

## 2019-01-14 ENCOUNTER — OFFICE VISIT (OUTPATIENT)
Dept: GASTROENTEROLOGY | Facility: CLINIC | Age: 64
End: 2019-01-14

## 2019-01-14 VITALS
DIASTOLIC BLOOD PRESSURE: 69 MMHG | SYSTOLIC BLOOD PRESSURE: 122 MMHG | BODY MASS INDEX: 31.84 KG/M2 | WEIGHT: 215 LBS | HEART RATE: 65 BPM | HEIGHT: 69 IN

## 2019-01-14 DIAGNOSIS — K74.60 CIRRHOSIS OF LIVER WITHOUT ASCITES, UNSPECIFIED HEPATIC CIRRHOSIS TYPE (HCC): Primary | ICD-10-CM

## 2019-01-14 DIAGNOSIS — K22.89 ESOPHAGEAL LEUKOPLAKIA: Primary | ICD-10-CM

## 2019-01-14 DIAGNOSIS — K22.89 ESOPHAGEAL LEUKOPLAKIA: ICD-10-CM

## 2019-01-14 PROCEDURE — 99213 OFFICE O/P EST LOW 20 MIN: CPT | Performed by: INTERNAL MEDICINE

## 2019-01-14 PROCEDURE — 99212 OFFICE O/P EST SF 10 MIN: CPT | Performed by: INTERNAL MEDICINE

## 2019-01-14 NOTE — PROGRESS NOTES
HPI:    Patient ID: Angeles Rodrigues is a 66year old male. HPI  Christiano Deng returns in follow-up. He was last seen at endoscopy in January 2018.     As per previous notes he has a history of elevated liver chemistry tests with negative serologies in the setti ultrasound and laboratory testing performed for [facility] 75. screening both of which were negative. Unfortunately the patient continues to smoke. He drinks about 4 beers once monthly when he goes out with his friends.   He states that the next time he will drink 55-2 % Apply Externally Ointment Apply to rectal area as directed per MD. Disp: 1 each Rfl: 3   Ascorbic Acid (VITAMIN C) 100 MG Oral Tab Take  by mouth. Disp:  Rfl:    aspirin (ASPIR-81) 81 MG Oral Tab EC Take  by mouth.  Disp:  Rfl:    Cholecalciferol (VI behavior is normal.   Nursing note and vitals reviewed.       Component      Latest Ref Rng & Units 1/3/2018   Glucose      70 - 99 mg/dL 115 (H)   Sodium      136 - 144 mmol/L 142   Potassium      3.3 - 5.1 mmol/L 4.2   Chloride      95 - 110 mmol/L 106 [facility], 5/02/2014, 7:21. [facility], 8/27/2015, 8:08. [facility] (PCT=[id number]), 6/01/2017, 9:32.   [facility], poorly defined region of hypoechogenicity in the left hepatic lobe, not significantly changed from previous. Although this could represent an area of focal fatty sparing, this   is not completely characterized by sonographic technique.      2. A gallbladder

## 2019-01-14 NOTE — PATIENT INSTRUCTIONS
1.  Stop smoking. 2.  Would avoid alcohol as completely as possible. 3.  Blood work and liver ultrasound every 6 months. 4.  Schedule upper endoscopy for history of esophageal leukoplakia.

## 2019-01-14 NOTE — TELEPHONE ENCOUNTER
Scheduled for:  EGD 65789  Provider Name: Dr Kurt Rose  Date:  Lynn Velasquez 3/12/18  Location:  [facility]  Sedation:  MAC  Time:  7:30 am  Prep: Nothing after midnight the day before procedure and NPO 3 hrs prior procedure  Meds/Allergies Reconciled?:  Codeine, Doxycycline, Lc Charity

## 2019-01-15 DIAGNOSIS — E78.00 PURE HYPERCHOLESTEROLEMIA: ICD-10-CM

## 2019-01-15 RX ORDER — FENOFIBRATE 160 MG/1
TABLET ORAL
Qty: 90 TABLET | Refills: 0 | Status: ON HOLD | OUTPATIENT
Start: 2019-01-15 | End: 2019-04-08

## 2019-01-16 NOTE — TELEPHONE ENCOUNTER
Refill passed per [facility], [facility] protocol.   Cholesterol Medications  Protocol Criteria:  · Appointment scheduled in the past 12 months or in the next 3 months  · ALT & LDL on file in the past 12 months  · ALT result < 80  · LDL result <130   Recent Outpat

## 2019-01-21 ENCOUNTER — OFFICE VISIT (OUTPATIENT)
Dept: AUDIOLOGY | Facility: CLINIC | Age: 64
End: 2019-01-21

## 2019-01-21 DIAGNOSIS — H90.3 SENSORINEURAL HEARING LOSS, BILATERAL: Primary | ICD-10-CM

## 2019-01-21 PROCEDURE — 92592 HEARING AID CHECK, ONE EAR: CPT | Performed by: AUDIOLOGIST

## 2019-01-21 NOTE — PROGRESS NOTES
HEARING AID FOLLOW-UP    Stacie Bhatti  10/31/1955  [id number]    Patient is here because his Oticon OPN 2 would not pair to his cell phone. Tried repairing, used fresh batteries, but devices would not pair to pt's phone or holly's phone.   Programme

## 2019-01-23 DIAGNOSIS — E78.2 MIXED DYSLIPIDEMIA: ICD-10-CM

## 2019-01-23 DIAGNOSIS — K21.9 GASTROESOPHAGEAL REFLUX DISEASE WITHOUT ESOPHAGITIS: ICD-10-CM

## 2019-01-23 RX ORDER — ATORVASTATIN CALCIUM 80 MG/1
TABLET, FILM COATED ORAL
Qty: 90 TABLET | Refills: 0 | Status: ON HOLD | OUTPATIENT
Start: 2019-01-23 | End: 2019-04-08

## 2019-01-23 RX ORDER — DICYCLOMINE HYDROCHLORIDE 10 MG/1
CAPSULE ORAL
Qty: 180 CAPSULE | Refills: 0 | Status: SHIPPED | OUTPATIENT
Start: 2019-01-23 | End: 2019-04-30

## 2019-01-23 RX ORDER — LOSARTAN POTASSIUM 50 MG/1
TABLET ORAL
Qty: 90 TABLET | Refills: 0 | Status: SHIPPED | OUTPATIENT
Start: 2019-01-23 | End: 2019-04-30

## 2019-01-29 ENCOUNTER — AUDIOLOGY DOCUMENTATION (OUTPATIENT)
Dept: AUDIOLOGY | Facility: CLINIC | Age: 64
End: 2019-01-29

## 2019-01-29 NOTE — PROGRESS NOTES
10/31/1955  [id number]  Luis Alberto Tenorio is a 66year old male         Pt's hearing aids are back from in-warranty repair.  replaced the units and upgraded to firmware.   New SN:  Right [id number]      Left [id number]    Listening check confirmed go

## 2019-02-06 RX ORDER — GABAPENTIN 300 MG/1
CAPSULE ORAL
Qty: 180 CAPSULE | Refills: 0 | Status: SHIPPED | OUTPATIENT
Start: 2019-02-06 | End: 2019-05-12

## 2019-02-06 RX ORDER — CETIRIZINE HYDROCHLORIDE 10 MG/1
TABLET ORAL
Qty: 90 TABLET | Refills: 0 | Status: SHIPPED | OUTPATIENT
Start: 2019-02-06 | End: 2019-04-30

## 2019-02-15 ENCOUNTER — TELEPHONE (OUTPATIENT)
Dept: PULMONOLOGY | Facility: CLINIC | Age: 64
End: 2019-02-15

## 2019-02-15 DIAGNOSIS — G47.33 OSA (OBSTRUCTIVE SLEEP APNEA): Primary | ICD-10-CM

## 2019-02-15 NOTE — TELEPHONE ENCOUNTER
Per protocol, Pt seen within 2 years. Cpap supplies order placed in HME folder for pick-up by Carleen/[facility].

## 2019-02-18 RX ORDER — FAMOTIDINE 40 MG/1
TABLET, FILM COATED ORAL
Qty: 90 TABLET | Refills: 0 | Status: SHIPPED | OUTPATIENT
Start: 2019-02-18 | End: 2019-05-12

## 2019-02-19 NOTE — TELEPHONE ENCOUNTER
Refill passed per [facility] protocol.   Refill Protocol Appointment Criteria  · Appointment scheduled in the past 12 months or in the next 3 months  Recent Outpatient Visits            4 weeks ago Sensorineural hearing loss, bilateral    [facility]

## 2019-02-27 ENCOUNTER — OFFICE VISIT (OUTPATIENT)
Dept: INTERNAL MEDICINE CLINIC | Facility: CLINIC | Age: 64
End: 2019-02-27

## 2019-02-27 VITALS
HEIGHT: 69 IN | SYSTOLIC BLOOD PRESSURE: 134 MMHG | HEART RATE: 66 BPM | WEIGHT: 213 LBS | BODY MASS INDEX: 31.55 KG/M2 | TEMPERATURE: 98 F | DIASTOLIC BLOOD PRESSURE: 62 MMHG

## 2019-02-27 DIAGNOSIS — E11.9 TYPE 2 DIABETES MELLITUS WITHOUT COMPLICATION, UNSPECIFIED WHETHER LONG TERM INSULIN USE (HCC): ICD-10-CM

## 2019-02-27 DIAGNOSIS — F17.210 CIGARETTE NICOTINE DEPENDENCE WITHOUT COMPLICATION: ICD-10-CM

## 2019-02-27 DIAGNOSIS — I10 ESSENTIAL HYPERTENSION: Primary | ICD-10-CM

## 2019-02-27 PROCEDURE — 99214 OFFICE O/P EST MOD 30 MIN: CPT | Performed by: INTERNAL MEDICINE

## 2019-02-27 PROCEDURE — 99212 OFFICE O/P EST SF 10 MIN: CPT | Performed by: INTERNAL MEDICINE

## 2019-02-27 NOTE — PROGRESS NOTES
Patient ID: Codey Randle is a 66year old male. Patient presents with: Follow - Up: routine follow up        HISTORY OF PRESENT ILLNESS:   HPI  Patient presents for above. Here for follow-up of multiple issues.     History of high blood pressure med CYSTOSCOPY N/A 7/11/2017    Performed by Joe Foy MD at [facility] OR   • ELECTROCARDIOGRAM, COMPLETE  8/16/2012   • ESOPHAGOGASTRODUODENOSCOPY (EGD) N/A 2/6/2017    Performed by Beata Daniel MD at [facility] MOUTH EVERY NIGHT, Disp: 90 capsule, Rfl: 0  •  METFORMIN  MG Oral Tab, TAKE 4 TABLETS BY MOUTH DAILY AT DINNER, Disp: 360 tablet, Rfl: 0  •  Omega-3 Fatty Acids (FISH OIL TRIPLE STRENGTH) 1400 MG Oral Cap, Take by mouth BID (Nitrates). , Disp: , Rfl Years: 20.00        Pack years: 30        Types: Cigarettes      Smokeless tobacco: Current User    Substance and Sexual Activity      Alcohol use:  Yes        Alcohol/week: 0.0 oz        Comment: 2 beers once a week      Drug use: No      Sexual activ Constitutional: He appears well-developed and well-nourished. Eyes: No scleral icterus. Cardiovascular: Normal rate, regular rhythm and normal heart sounds. Pulmonary/Chest: Effort normal and breath sounds normal. No respiratory distress.    Abdomin

## 2019-03-02 DIAGNOSIS — K21.9 GASTROESOPHAGEAL REFLUX DISEASE WITHOUT ESOPHAGITIS: ICD-10-CM

## 2019-03-03 RX ORDER — LOSARTAN POTASSIUM 50 MG/1
TABLET ORAL
Qty: 90 TABLET | Refills: 0 | OUTPATIENT
Start: 2019-03-03

## 2019-03-03 RX ORDER — DICYCLOMINE HYDROCHLORIDE 10 MG/1
CAPSULE ORAL
Qty: 180 CAPSULE | Refills: 0 | OUTPATIENT
Start: 2019-03-03

## 2019-03-03 NOTE — TELEPHONE ENCOUNTER
Duplicate request, previously addressed.       Refill request too soon  On 1/23/19  #90 refilled on both- denied

## 2019-03-04 RX ORDER — PANTOPRAZOLE SODIUM 40 MG/1
40 TABLET, DELAYED RELEASE ORAL
Qty: 180 TABLET | Refills: 3 | Status: SHIPPED | OUTPATIENT
Start: 2019-03-04 | End: 2019-03-05

## 2019-03-04 NOTE — TELEPHONE ENCOUNTER
I sent the Rx into the pharmacy. Please clarify if the patient is taking the pantoprazole once daily or twice daily (I sent in the prescription for once daily). This will need to be changed if the patient is taking the prescription twice daily.

## 2019-03-04 NOTE — TELEPHONE ENCOUNTER
Requested Prescriptions     Pending Prescriptions Disp Refills   • Pantoprazole Sodium 40 MG Oral Tab  tablet 3       lov-1/14/19  lr-12/30/18

## 2019-03-05 RX ORDER — PANTOPRAZOLE SODIUM 40 MG/1
40 TABLET, DELAYED RELEASE ORAL
Qty: 180 TABLET | Refills: 3 | Status: SHIPPED | OUTPATIENT
Start: 2019-03-05 | End: 2019-07-28

## 2019-03-11 ENCOUNTER — TELEPHONE (OUTPATIENT)
Dept: GASTROENTEROLOGY | Facility: CLINIC | Age: 64
End: 2019-03-11

## 2019-03-11 DIAGNOSIS — K22.89 ESOPHAGEAL LEUKOPLAKIA: Primary | ICD-10-CM
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
## 2019-03-15 NOTE — TELEPHONE ENCOUNTER
I spoke to this patient to schedule his procedure but he did not have his schedule with him at this moment and requested I CB later today.

## 2019-03-19 NOTE — TELEPHONE ENCOUNTER
Scheduled for:  EGD - 65684  Provider Name:  Dr. Chow March  Date:  4/9/19  Location:  [facility]  Sedation:  IV  Time:  11:30 am (pt is aware to arrive at 10:30 am)  Prep:  NPO after midnight, Prep instructions were given to pt over the phone, pt verbalized understandin

## 2019-03-27 NOTE — TELEPHONE ENCOUNTER
Pt states HME did not receive order for CPAP supplies. Please fax order for CPAP supplies. Pt states initial order  yesterday.  [phone](PUXEQU call pt tomorrow)

## 2019-03-28 ENCOUNTER — TELEPHONE (OUTPATIENT)
Dept: INTERNAL MEDICINE CLINIC | Facility: CLINIC | Age: 64
End: 2019-03-28

## 2019-03-28 NOTE — TELEPHONE ENCOUNTER
Fax received from [facility]; Your patient's insurance or IPA group required authorization for DME supplies to be requested by the patient's PCP. Please request the supplies listed for the patient below.                 Patient's Name: Maria Teresa El

## 2019-03-31 DIAGNOSIS — N40.1 BENIGN NODULAR PROSTATIC HYPERPLASIA WITH LOWER URINARY TRACT SYMPTOMS: ICD-10-CM

## 2019-04-01 RX ORDER — TERAZOSIN 5 MG/1
CAPSULE ORAL
Qty: 90 CAPSULE | Refills: 1 | Status: SHIPPED | OUTPATIENT
Start: 2019-04-01 | End: 2019-07-28

## 2019-04-02 ENCOUNTER — TELEPHONE (OUTPATIENT)
Dept: PULMONOLOGY | Facility: CLINIC | Age: 64
End: 2019-04-02

## 2019-04-02 DIAGNOSIS — N40.1 BENIGN NODULAR PROSTATIC HYPERPLASIA WITH LOWER URINARY TRACT SYMPTOMS: ICD-10-CM

## 2019-04-02 NOTE — TELEPHONE ENCOUNTER
Pt states he is having difficulty obtaining Cpap supplies from [facility]. Requesting assistance. Pls call. Thank you.

## 2019-04-02 NOTE — TELEPHONE ENCOUNTER
Rhea from [address] [place] [address] states they received authorization today from the insurance and someone will be reaching out to the pt to schedule delivery of supplies. Pt informed of this. Pt verbalized understanding. Pt states he is not happy with HME.   Pt informed to

## 2019-04-03 RX ORDER — FINASTERIDE 5 MG/1
TABLET, FILM COATED ORAL
Qty: 90 TABLET | Refills: 0 | Status: SHIPPED | OUTPATIENT
Start: 2019-04-03 | End: 2019-07-01

## 2019-04-03 NOTE — TELEPHONE ENCOUNTER
Review pended refill request as it does not fall under a protocol.     Last Rx: 12/21/18  LOV: 2/27/19

## 2019-04-08 DIAGNOSIS — E78.2 MIXED DYSLIPIDEMIA: ICD-10-CM

## 2019-04-08 DIAGNOSIS — E78.00 PURE HYPERCHOLESTEROLEMIA: ICD-10-CM

## 2019-04-09 ENCOUNTER — HOSPITAL ENCOUNTER (OUTPATIENT)
Facility: HOSPITAL | Age: 64
Setting detail: HOSPITAL OUTPATIENT SURGERY
Discharge: HOME | End: 2019-04-09
Attending: INTERNAL MEDICINE | Admitting: INTERNAL MEDICINE
Payer: COMMERCIAL

## 2019-04-09 VITALS
SYSTOLIC BLOOD PRESSURE: 126 MMHG | HEIGHT: 69 IN | DIASTOLIC BLOOD PRESSURE: 66 MMHG | BODY MASS INDEX: 30.36 KG/M2 | HEART RATE: 72 BPM | WEIGHT: 205 LBS | OXYGEN SATURATION: 95 % | RESPIRATION RATE: 15 BRPM

## 2019-04-09 DIAGNOSIS — K22.89 ESOPHAGEAL LEUKOPLAKIA: ICD-10-CM

## 2019-04-09 PROCEDURE — G0500 MOD SEDAT ENDO SERVICE >5YRS: HCPCS | Performed by: INTERNAL MEDICINE

## 2019-04-09 PROCEDURE — 43239 EGD BIOPSY SINGLE/MULTIPLE: CPT | Performed by: INTERNAL MEDICINE

## 2019-04-09 PROCEDURE — 0DB58ZX EXCISION OF ESOPHAGUS, VIA NATURAL OR ARTIFICIAL OPENING ENDOSCOPIC, DIAGNOSTIC: ICD-10-PCS | Performed by: INTERNAL MEDICINE

## 2019-04-09 RX ORDER — ATORVASTATIN CALCIUM 80 MG/1
TABLET, FILM COATED ORAL
Qty: 90 TABLET | Refills: 1 | Status: SHIPPED | OUTPATIENT
Start: 2019-04-09 | End: 2019-10-07

## 2019-04-09 RX ORDER — SODIUM CHLORIDE, SODIUM LACTATE, POTASSIUM CHLORIDE, CALCIUM CHLORIDE 600; 310; 30; 20 MG/100ML; MG/100ML; MG/100ML; MG/100ML
INJECTION, SOLUTION INTRAVENOUS CONTINUOUS
Status: DISCONTINUED | OUTPATIENT
Start: 2019-04-09 | End: 2019-04-09

## 2019-04-09 RX ORDER — MIDAZOLAM HYDROCHLORIDE 1 MG/ML
1 INJECTION INTRAMUSCULAR; INTRAVENOUS EVERY 5 MIN PRN
Status: DISCONTINUED | OUTPATIENT
Start: 2019-04-09 | End: 2019-04-09

## 2019-04-09 RX ORDER — MIDAZOLAM HYDROCHLORIDE 1 MG/ML
INJECTION INTRAMUSCULAR; INTRAVENOUS
Status: DISCONTINUED | OUTPATIENT
Start: 2019-04-09 | End: 2019-04-09

## 2019-04-09 RX ORDER — SODIUM CHLORIDE 0.9 % (FLUSH) 0.9 %
10 SYRINGE (ML) INJECTION AS NEEDED
Status: DISCONTINUED | OUTPATIENT
Start: 2019-04-09 | End: 2019-04-09

## 2019-04-09 RX ORDER — FENOFIBRATE 160 MG/1
TABLET ORAL
Qty: 90 TABLET | Refills: 1 | Status: SHIPPED | OUTPATIENT
Start: 2019-04-09 | End: 2019-10-07

## 2019-04-09 NOTE — TELEPHONE ENCOUNTER
Refill passed per [facility], [facility] protocol.   Cholesterol Medications  Protocol Criteria:  · Appointment scheduled in the past 12 months or in the next 3 months  · ALT & LDL on file in the past 12 months  · ALT result < 80  · LDL result <130   Recent Outpat

## 2019-04-09 NOTE — H&P
History & Physical Examination    Patient Name: Rick Rowland  MRN: [id number]  CSN: [id number]  YOB: 1955    Diagnosis: History of esophageal leukoplakia        Medications Prior to Admission:  FINASTERIDE 5 MG Oral Tab TAKE 1 TABLET(5 M UNITS Oral Cap Take 1,000 Units by mouth daily. Disp:  Rfl:  4/5/2019   Multiple Vitamins-Minerals (CENTRUM SILVER ULTRA MENS) Oral Tab Take  by mouth.  Disp:  Rfl:  4/5/2019   ACCU-CHEK BEV PLUS In Vitro Strip USE TWICE DAILY Disp: 200 strip Rfl: 0 Mario Performed by Joe Foy MD at [facility] OR   • ELECTROCARDIOGRAM, COMPLETE  8/16/2012   • ESOPHAGOGASTRODUODENOSCOPY (EGD) N/A 2/6/2017    Performed by Beata Daniel MD at [address] [place] [address] N/A 10/31/2017    Performed HEART [place] ] [ Alejandra Abdullahi [place] ] [ Vee Langston [place] ] [ Gerardo Nguyen [place] ] [ Thony Rosa        [ x ] I have discussed the risks and benefits and alternatives with the patient/family. They understand and agree to proceed with plan of care.   [ x ] I hav

## 2019-04-09 NOTE — OPERATIVE REPORT
[facility] Endoscopy Report      Date of Procedure:  04/09/19        Preoperative Diagnosis:  History of esophageal leukoplakia      Postoperative Diagnosis:  1. Benign-appearing mid esophageal nodule  2.   Hiatal hernia      Procedure: insufflated air. The mucosa of the stomach including cardia, fundus, gastric body and antrum were normal.  The duodenal bulb and post bulbar regions were normal.      Impression:  1. Mid esophageal nodule and adjacent mucosal changes as described above.

## 2019-04-11 ENCOUNTER — TELEPHONE (OUTPATIENT)
Dept: GASTROENTEROLOGY | Facility: CLINIC | Age: 64
End: 2019-04-11

## 2019-04-11 ENCOUNTER — PATIENT MESSAGE (OUTPATIENT)
Dept: GASTROENTEROLOGY | Facility: CLINIC | Age: 64
End: 2019-04-11

## 2019-04-11 NOTE — TELEPHONE ENCOUNTER
From: Rick Rowland  To: Dane Pastrana MD  Sent: 4/11/2019 10:36 AM CDT  Subject: Other    I would ,like to thank Dr Lizeth Mcconnell , and every one ,else that made this procedure ,i had on 4-9-19 ,go so smoothly . All of you are amazing .  Once aga

## 2019-04-11 NOTE — TELEPHONE ENCOUNTER
----- Message from Cornelio Quick MD sent at 4/10/2019  8:05 PM CDT -----  I left a message on the patient's personal voicemail. His esophageal biopsy showed no leukoplakia and no precancerous changes.   I have recommended a surveillance EGD in 3 yea

## 2019-04-16 ENCOUNTER — PATIENT MESSAGE (OUTPATIENT)
Dept: INTERNAL MEDICINE CLINIC | Facility: CLINIC | Age: 64
End: 2019-04-16

## 2019-04-16 NOTE — TELEPHONE ENCOUNTER
From: Nicolette Cramer  To: Olga Cadet MD  Sent: 4/16/2019 11:25 AM CDT  Subject: Non-Urgent Medical Question    I was just curious about the message I sent to you , last week, about my medicine.

## 2019-04-30 DIAGNOSIS — K21.9 GASTROESOPHAGEAL REFLUX DISEASE WITHOUT ESOPHAGITIS: ICD-10-CM

## 2019-05-01 RX ORDER — DICYCLOMINE HYDROCHLORIDE 10 MG/1
CAPSULE ORAL
Qty: 180 CAPSULE | Refills: 1 | Status: SHIPPED | OUTPATIENT
Start: 2019-05-01 | End: 2019-07-28

## 2019-05-01 RX ORDER — CETIRIZINE HYDROCHLORIDE 10 MG/1
TABLET ORAL
Qty: 90 TABLET | Refills: 1 | Status: SHIPPED | OUTPATIENT
Start: 2019-05-01 | End: 2019-07-28

## 2019-05-01 RX ORDER — LOSARTAN POTASSIUM 50 MG/1
TABLET ORAL
Qty: 90 TABLET | Refills: 1 | Status: SHIPPED | OUTPATIENT
Start: 2019-05-01 | End: 2019-07-28

## 2019-05-08 ENCOUNTER — TELEPHONE (OUTPATIENT)
Dept: GASTROENTEROLOGY | Facility: CLINIC | Age: 64
End: 2019-05-08

## 2019-05-08 DIAGNOSIS — K74.60 HEPATIC CIRRHOSIS, UNSPECIFIED HEPATIC CIRRHOSIS TYPE, UNSPECIFIED WHETHER ASCITES PRESENT (HCC): Primary | ICD-10-CM

## 2019-05-08 NOTE — TELEPHONE ENCOUNTER
Patient called and notified to call central scheduling to have US of liver done at [phone] and blood work as well. Patient verbalized message was understood and had no other questions.

## 2019-05-08 NOTE — TELEPHONE ENCOUNTER
----- Message from Rosendo Martinez RN sent at 12/24/2018 10:18 AM CST -----  Regarding: Recall for liver US and blood work entered for 6 months due end of June 2019  Recall for liver US and blood work entered for 6 months due end of June 2019

## 2019-05-11 ENCOUNTER — HOSPITAL ENCOUNTER (OUTPATIENT)
Dept: ULTRASOUND IMAGING | Facility: HOSPITAL | Age: 64
Discharge: HOME | End: 2019-05-11
Attending: NURSE PRACTITIONER
Payer: COMMERCIAL

## 2019-05-11 ENCOUNTER — LAB ENCOUNTER (OUTPATIENT)
Dept: LAB | Facility: HOSPITAL | Age: 64
End: 2019-05-11
Attending: NURSE PRACTITIONER
Payer: COMMERCIAL

## 2019-05-11 DIAGNOSIS — K74.60 HEPATIC CIRRHOSIS, UNSPECIFIED HEPATIC CIRRHOSIS TYPE, UNSPECIFIED WHETHER ASCITES PRESENT (HCC): ICD-10-CM

## 2019-05-11 PROCEDURE — 76705 ECHO EXAM OF ABDOMEN: CPT | Performed by: NURSE PRACTITIONER

## 2019-05-11 PROCEDURE — 85025 COMPLETE CBC W/AUTO DIFF WBC: CPT

## 2019-05-11 PROCEDURE — 82105 ALPHA-FETOPROTEIN SERUM: CPT

## 2019-05-11 PROCEDURE — 36415 COLL VENOUS BLD VENIPUNCTURE: CPT

## 2019-05-11 PROCEDURE — 80053 COMPREHEN METABOLIC PANEL: CPT

## 2019-05-13 RX ORDER — GABAPENTIN 300 MG/1
CAPSULE ORAL
Qty: 180 CAPSULE | Refills: 1 | Status: SHIPPED | OUTPATIENT
Start: 2019-05-13 | End: 2019-11-07

## 2019-05-13 RX ORDER — FAMOTIDINE 40 MG/1
TABLET, FILM COATED ORAL
Qty: 90 TABLET | Refills: 1 | Status: SHIPPED | OUTPATIENT
Start: 2019-05-13 | End: 2019-11-07

## 2019-05-13 NOTE — TELEPHONE ENCOUNTER
Refill passed per [facility], [facility] protocol.   Refill Protocol Appointment Criteria  · Appointment scheduled in the past 6 months or in the next 3 months  Recent Outpatient Visits            2 months ago Essential hypertension    [facility], [place] for [facility] Audiology Bryon Lee    Office Visit        Future Appointments       Provider Department Appt Notes    In 1 month MarmadukeGinger. Bea [facility] Audiology [facility]

## 2019-05-17 ENCOUNTER — TELEPHONE (OUTPATIENT)
Dept: PULMONOLOGY | Facility: CLINIC | Age: 64
End: 2019-05-17

## 2019-05-17 NOTE — TELEPHONE ENCOUNTER
Per pt Lonny Chiu is missing a copy of sleep study report. Sleep study faxed to Lonny Chiu. Pt states to attention it to Hetal/[facility]io.

## 2019-05-17 NOTE — TELEPHONE ENCOUNTER
Pt states that Julio needs a copy of sleep study results so that they can fill order for CPAP supplies. Pt did not have fax #. Please call.

## 2019-05-20 ENCOUNTER — TELEPHONE (OUTPATIENT)
Dept: PULMONOLOGY | Facility: CLINIC | Age: 64
End: 2019-05-20

## 2019-05-20 NOTE — TELEPHONE ENCOUNTER
Pt requesting to sandoval butt rn regarding Rachael Mora form for cpap supplies that requires Dr [address] signature,necessity for supplies and type of cpap needed, pls call at:[phone],thanks.   *form faxed to office last week

## 2019-05-21 NOTE — TELEPHONE ENCOUNTER
Pt informed of our office having form and just waiting for Dr to sign off. Form placed in [facility] folder for sign off.

## 2019-05-24 ENCOUNTER — TELEPHONE (OUTPATIENT)
Dept: PULMONOLOGY | Facility: CLINIC | Age: 64
End: 2019-05-24

## 2019-05-24 NOTE — TELEPHONE ENCOUNTER
Pt calling states he spoke w Kin Gallegos states they sent a message on 05/14/19 that they needed a copy of sleep study and it was not received .  Requesting to refax to [address] [facility]

## 2019-05-24 NOTE — TELEPHONE ENCOUNTER
Sleep study to faxed to laverne.  Faxed conformation recived reached out to [facility] / Radha Vila who was not  in office will try again next week

## 2019-05-28 NOTE — TELEPHONE ENCOUNTER
Spoke with Lily/ laverne and she states that fax was received can look into today but will look into it tomorrow.  Spoke with pt and pt understands and will be waiting for Laverne to reach out to him to get supplies if pt doesn't hear by Thursday to

## 2019-06-12 NOTE — TELEPHONE ENCOUNTER
Dr Char Mao, please advise on refill and any labs patient needs for his August physical.    Patient takes metformin 500 mg 4 pills at dinner. He said his blood sugars have been \"ok\".  He will schedule his physical for you for late August. Advised patient jameel

## 2019-06-12 NOTE — TELEPHONE ENCOUNTER
Requested Prescriptions     Pending Prescriptions Disp Refills   • METFORMIN  MG Oral Tab [Pharmacy Med Name: METFORMIN 500MG TABLETS] 360 tablet 1     Sig: TAKE 4 TABLETS BY MOUTH DAILY AT Pending sale to [facility]         Recent Visits  Date Type Provider Dept   02/

## 2019-06-13 ENCOUNTER — OFFICE VISIT (OUTPATIENT)
Dept: AUDIOLOGY | Facility: CLINIC | Age: 64
End: 2019-06-13

## 2019-06-13 DIAGNOSIS — H90.3 SENSORINEURAL HEARING LOSS, BILATERAL: Primary | ICD-10-CM

## 2019-06-13 PROCEDURE — 92593 HEARING AID CHECK, BOTH EARS: CPT | Performed by: AUDIOLOGIST

## 2019-06-14 NOTE — PROGRESS NOTES
HEARING AID FOLLOW-UP    Nicolette Cramer  10/31/1955  [id number]    Patient is here for annual hearing aid check.     Hearing aid informationRogene Radhika DIGGS 2-T  Right #[id number]  Left #[id number]  Dispensing date:  5-17-18  Warranty: 3 years  Double bass do

## 2019-06-25 ENCOUNTER — OFFICE VISIT (OUTPATIENT)
Dept: INTERNAL MEDICINE CLINIC | Facility: CLINIC | Age: 64
End: 2019-06-25

## 2019-06-25 VITALS
SYSTOLIC BLOOD PRESSURE: 164 MMHG | TEMPERATURE: 98 F | OXYGEN SATURATION: 97 % | HEART RATE: 66 BPM | HEIGHT: 69 IN | DIASTOLIC BLOOD PRESSURE: 80 MMHG | WEIGHT: 215 LBS | BODY MASS INDEX: 31.84 KG/M2

## 2019-06-25 DIAGNOSIS — R09.82 POST-NASAL DRIP: ICD-10-CM

## 2019-06-25 DIAGNOSIS — R09.81 SINUS CONGESTION: ICD-10-CM

## 2019-06-25 DIAGNOSIS — J02.9 SORE THROAT: Primary | ICD-10-CM

## 2019-06-25 PROCEDURE — 87880 STREP A ASSAY W/OPTIC: CPT | Performed by: INTERNAL MEDICINE

## 2019-06-25 PROCEDURE — 99212 OFFICE O/P EST SF 10 MIN: CPT | Performed by: INTERNAL MEDICINE

## 2019-06-25 PROCEDURE — 99213 OFFICE O/P EST LOW 20 MIN: CPT | Performed by: INTERNAL MEDICINE

## 2019-06-25 NOTE — PROGRESS NOTES
Patient ID: David Vickers is a 66year old male. Patient presents with:  Nasal Congestion: Started on Thursday last week, and some redness on the throat. Itchiness on throat.         HISTORY OF PRESENT ILLNESS:   HPI  4 day(s) ago  Fever - No, Tmax (N/ ENDOSCOPY   • ESOPHAGOGASTRODUODENOSCOPY (EGD) N/A 2/6/2017    Performed by Akila Rodriguez MD at [address] [place] [address] N/A 10/31/2017    Performed by Akila Rodriguez MD at [address] [place] [address] (two) times daily before meals. , Disp: 180 tablet, Rfl: 3  •  Omega-3 Fatty Acids (FISH OIL TRIPLE STRENGTH) 1400 MG Oral Cap, Take by mouth BID (Nitrates). , Disp: , Rfl:   •  ACCU-CHEK BEV PLUS In Vitro Strip, USE TWICE DAILY, Disp: 200 strip, Rfl: 0  • tobacco: Current User    Substance and Sexual Activity      Alcohol use:  Yes        Alcohol/week: 0.0 oz        Comment: 2 beers once a week      Drug use: No      Sexual activity: Not on file    Lifestyle      Physical activity:        Days per week: Not atraumatic. Right Ear: Tympanic membrane, external ear and ear canal normal.   Left Ear: Tympanic membrane, external ear and ear canal normal.   Nose: Right sinus exhibits no maxillary sinus tenderness and no frontal sinus tenderness.  Left sinus exhibits

## 2019-07-01 DIAGNOSIS — N40.1 BENIGN NODULAR PROSTATIC HYPERPLASIA WITH LOWER URINARY TRACT SYMPTOMS: ICD-10-CM

## 2019-07-01 RX ORDER — FINASTERIDE 5 MG/1
TABLET, FILM COATED ORAL
Qty: 90 TABLET | Refills: 1 | Status: SHIPPED | OUTPATIENT
Start: 2019-07-01 | End: 2019-07-28

## 2019-07-02 NOTE — TELEPHONE ENCOUNTER
Review pended refill request as it does not fall under a protocol.     Requested Prescriptions     Pending Prescriptions Disp Refills   • FINASTERIDE 5 MG Oral Tab [Pharmacy Med Name: FINASTERIDE 5MG TABLETS] 90 tablet 0     Sig: TAKE 1 TABLET(5 MG) BY JASWANT

## 2019-07-26 ENCOUNTER — TELEPHONE (OUTPATIENT)
Dept: GASTROENTEROLOGY | Facility: CLINIC | Age: 64
End: 2019-07-26

## 2019-07-26 ENCOUNTER — TELEPHONE (OUTPATIENT)
Dept: INTERNAL MEDICINE CLINIC | Facility: CLINIC | Age: 64
End: 2019-07-26

## 2019-07-26 DIAGNOSIS — K74.60 CIRRHOSIS OF LIVER WITHOUT ASCITES, UNSPECIFIED HEPATIC CIRRHOSIS TYPE (HCC): Primary | ICD-10-CM

## 2019-07-26 DIAGNOSIS — G47.33 OBSTRUCTIVE SLEEP APNEA: Primary | ICD-10-CM

## 2019-07-26 NOTE — TELEPHONE ENCOUNTER
Patient has appt with Dr. Nanette Andre (Pulmonology) on 08/28/2019 and needs new referral. This is for a follow up visit.

## 2019-07-26 NOTE — TELEPHONE ENCOUNTER
Dr. Naif Olea- Please advise when patient should schedule f/u OV. Patient states is not having any symptoms.   Last seen 1/14/19

## 2019-07-28 DIAGNOSIS — N40.1 BENIGN NODULAR PROSTATIC HYPERPLASIA WITH LOWER URINARY TRACT SYMPTOMS: ICD-10-CM

## 2019-07-28 DIAGNOSIS — K21.9 GASTROESOPHAGEAL REFLUX DISEASE WITHOUT ESOPHAGITIS: ICD-10-CM

## 2019-07-29 ENCOUNTER — TELEPHONE (OUTPATIENT)
Dept: INTERNAL MEDICINE CLINIC | Facility: CLINIC | Age: 64
End: 2019-07-29

## 2019-07-29 RX ORDER — PANTOPRAZOLE SODIUM 40 MG/1
40 TABLET, DELAYED RELEASE ORAL
Qty: 180 TABLET | Refills: 3 | Status: SHIPPED | OUTPATIENT
Start: 2019-07-29 | End: 2019-11-07

## 2019-07-29 NOTE — TELEPHONE ENCOUNTER
Future Appointments   Date Time Provider Thalia Zuluaga   12/19/2019 10:00 AM Duong Rey MD [facility] Celine WATKINS     Patient advised to also make sure he gets referral for office visit.  Pt informed that he is due for liver US and blood wor

## 2019-07-29 NOTE — TELEPHONE ENCOUNTER
Refill passed per [facility], [facility] protocol, but no refills given, due to high level interactions and no protocol for the terazocin.   Refill Protocol Appointment Criteria  · Appointment scheduled in the past 6 months or in the next 3 months  Recent Outpatie Ck Rivero MD    Office Visit    6 months ago Sensorineural hearing loss, bilateral    [facility] Bryon Grimes    Office Visit    6 months ago Cirrhosis of liver without ascites, unspecified hepatic cirrho

## 2019-07-29 NOTE — TELEPHONE ENCOUNTER
Pt is requesting a follow up referral to see his Gastroenterology Dr. Ebony John the appt is 12/19     Please advise

## 2019-07-29 NOTE — TELEPHONE ENCOUNTER
Requested Prescriptions     Pending Prescriptions Disp Refills   • Pantoprazole Sodium 40 MG Oral Tab  tablet 3     Sig: Take 1 tablet (40 mg total) by mouth 2 (two) times daily before meals.      LOV-1/14/19 AND 4/9/19-EGD  LR-3/5/19

## 2019-07-31 RX ORDER — TERAZOSIN 5 MG/1
5 CAPSULE ORAL NIGHTLY
Qty: 90 CAPSULE | Refills: 3 | Status: SHIPPED | OUTPATIENT
Start: 2019-07-31 | End: 2020-06-10

## 2019-07-31 RX ORDER — FINASTERIDE 5 MG/1
5 TABLET, FILM COATED ORAL DAILY
Qty: 90 TABLET | Refills: 3 | Status: SHIPPED | OUTPATIENT
Start: 2019-07-31 | End: 2020-06-10

## 2019-07-31 RX ORDER — DICYCLOMINE HYDROCHLORIDE 10 MG/1
10 CAPSULE ORAL 2 TIMES DAILY
Qty: 180 CAPSULE | Refills: 3 | Status: SHIPPED | OUTPATIENT
Start: 2019-07-31 | End: 2020-06-10

## 2019-07-31 RX ORDER — LOSARTAN POTASSIUM 50 MG/1
50 TABLET ORAL DAILY
Qty: 90 TABLET | Refills: 3 | Status: SHIPPED | OUTPATIENT
Start: 2019-07-31 | End: 2020-06-10

## 2019-07-31 RX ORDER — CETIRIZINE HYDROCHLORIDE 10 MG/1
10 TABLET ORAL DAILY
Qty: 90 TABLET | Refills: 3 | Status: SHIPPED | OUTPATIENT
Start: 2019-07-31

## 2019-08-01 ENCOUNTER — OFFICE VISIT (OUTPATIENT)
Dept: INTERNAL MEDICINE CLINIC | Facility: CLINIC | Age: 64
End: 2019-08-01

## 2019-08-01 VITALS
HEART RATE: 65 BPM | SYSTOLIC BLOOD PRESSURE: 132 MMHG | BODY MASS INDEX: 32.14 KG/M2 | HEIGHT: 69 IN | TEMPERATURE: 99 F | WEIGHT: 217 LBS | DIASTOLIC BLOOD PRESSURE: 80 MMHG

## 2019-08-01 DIAGNOSIS — Z12.11 COLON CANCER SCREENING: ICD-10-CM

## 2019-08-01 DIAGNOSIS — I10 ESSENTIAL HYPERTENSION: ICD-10-CM

## 2019-08-01 DIAGNOSIS — G47.33 OSA (OBSTRUCTIVE SLEEP APNEA): ICD-10-CM

## 2019-08-01 DIAGNOSIS — F17.210 CIGARETTE NICOTINE DEPENDENCE WITHOUT COMPLICATION: ICD-10-CM

## 2019-08-01 DIAGNOSIS — Z00.00 ANNUAL PHYSICAL EXAM: Primary | ICD-10-CM

## 2019-08-01 DIAGNOSIS — K21.9 GASTROESOPHAGEAL REFLUX DISEASE WITHOUT ESOPHAGITIS: ICD-10-CM

## 2019-08-01 DIAGNOSIS — E11.9 TYPE 2 DIABETES MELLITUS WITHOUT COMPLICATION, WITHOUT LONG-TERM CURRENT USE OF INSULIN (HCC): ICD-10-CM

## 2019-08-01 DIAGNOSIS — K74.60 CIRRHOSIS OF LIVER WITHOUT ASCITES, UNSPECIFIED HEPATIC CIRRHOSIS TYPE (HCC): ICD-10-CM

## 2019-08-01 DIAGNOSIS — E11.9 TYPE 2 DIABETES MELLITUS WITHOUT COMPLICATION, UNSPECIFIED WHETHER LONG TERM INSULIN USE (HCC): ICD-10-CM

## 2019-08-01 DIAGNOSIS — N40.1 BENIGN NODULAR PROSTATIC HYPERPLASIA WITH LOWER URINARY TRACT SYMPTOMS: ICD-10-CM

## 2019-08-01 DIAGNOSIS — E78.00 PURE HYPERCHOLESTEROLEMIA: ICD-10-CM

## 2019-08-01 DIAGNOSIS — E11.9 DIABETES MELLITUS TYPE 2 WITHOUT RETINOPATHY (HCC): ICD-10-CM

## 2019-08-01 PROCEDURE — 99396 PREV VISIT EST AGE 40-64: CPT | Performed by: INTERNAL MEDICINE

## 2019-08-01 PROCEDURE — 99213 OFFICE O/P EST LOW 20 MIN: CPT | Performed by: INTERNAL MEDICINE

## 2019-08-01 NOTE — PATIENT INSTRUCTIONS
Prevention Guidelines, Men Ages 48 to 59  Screening tests and vaccines are an important part of managing your health. A screening test is done to find possible disorders or diseases in people who don't have any symptoms.  The goal is to find a disease ear High cholesterol or triglycerides All men in this age group At least every 5 years   HIV Men at increased risk for infection – talk with your healthcare provider At routine exams   Lung cancer Adults age 54 to [de-identified] who have smoked Yearly screening in smokers Pneumococcal conjugate vaccine (PCV13) and pneumococcal polysaccharide vaccine (PPSV23) Men at increased risk for infection – talk with your healthcare provider PCV13: 1 dose ages 23 to 72 (protects against 13 types of pneumococcal bacteria)     PPSV23: 1 Most former smokers quit cold turkey (all at once). Trying to cut back gradually doesn't seem to work as well, perhaps because it continues the smoking habit. Also, it is possible to inhale more while smoking fewer cigarettes.  This results in the same amou · 20 minutes: Blood pressure and pulse return to normal.  · 8 hours: Oxygen levels return to normal.  · 2 days: Ability to smell and taste begin to improve as damaged nerves regrow. · 2 to 3 weeks: Circulation and lung function improve.   · 1 to 9 months:

## 2019-08-01 NOTE — PROGRESS NOTES
Patient ID: Anastasia Mayo is a 66year old male. Patient presents with:  Physical       HISTORY OF PRESENT ILLNESS:   HPI  Patient presents for above.   Here for annual physical and discuss multiple medical issues.     Patient with obstructive sleep ap Psychiatric/Behavioral: Negative.       MEDICAL HISTORY:     Past Medical History:   Diagnosis Date   • Carpal tunnel syndrome 6/3/2014    R endoscopic CTR   • Diabetes ([facility] [address] [address]    • Diabetes mellitus ([facility] [address]    • Disorder of liver    • Diverticulitis 2009   • Terazosin HCl 5 MG Oral Cap, Take 1 capsule (5 mg total) by mouth nightly., Disp: 90 capsule, Rfl: 3  •  Dicyclomine HCl 10 MG Oral Cap, Take 1 capsule (10 mg total) by mouth 2 (two) times daily. , Disp: 180 capsule, Rfl: 3  •  cetirizine 10 MG Oral Tab, Ta Blood Glucose Monitoring Suppl ([employer] CONTOUR MONITOR) W/DEVICE Does not apply Kit, , Disp: , Rfl:     Allergies:  Codeine                 NAUSEA AND VOMITING  Doxycycline             UNKNOWN  Lisinopril              Coughing  Red Dye                 HIVES Not on file    Other Topics      Concerns:         Service: Not Asked        Blood Transfusions: Not Asked        Caffeine Concern: Yes          coffee, 2 cups daily        Occupational Exposure: Not Asked        Hobby Hazards: Not Asked        Sle METABOLIC PANEL (14); Future  · LIPID PANEL; Future  · TSH W REFLEX TO FREE T4; Future  · PSA, DIAGNOSTIC; Future    2. Essential hypertension  · Well-controlled. · Low-salt diet. · Needs to stop smoking.     3. Pure hypercholesterolemia  · COMP METABOLIC

## 2019-08-03 ENCOUNTER — LAB ENCOUNTER (OUTPATIENT)
Dept: LAB | Facility: HOSPITAL | Age: 64
End: 2019-08-03
Attending: INTERNAL MEDICINE
Payer: COMMERCIAL

## 2019-08-03 DIAGNOSIS — Z00.00 ANNUAL PHYSICAL EXAM: ICD-10-CM

## 2019-08-03 DIAGNOSIS — K74.60 CIRRHOSIS OF LIVER WITHOUT ASCITES, UNSPECIFIED HEPATIC CIRRHOSIS TYPE (HCC): ICD-10-CM

## 2019-08-03 DIAGNOSIS — E78.00 PURE HYPERCHOLESTEROLEMIA: ICD-10-CM

## 2019-08-03 DIAGNOSIS — N40.1 BENIGN NODULAR PROSTATIC HYPERPLASIA WITH LOWER URINARY TRACT SYMPTOMS: ICD-10-CM

## 2019-08-03 DIAGNOSIS — E11.9 DIABETES MELLITUS TYPE 2 WITHOUT RETINOPATHY (HCC): ICD-10-CM

## 2019-08-03 DIAGNOSIS — E11.9 TYPE 2 DIABETES MELLITUS WITHOUT COMPLICATION, WITHOUT LONG-TERM CURRENT USE OF INSULIN (HCC): ICD-10-CM

## 2019-08-03 DIAGNOSIS — E11.9 TYPE 2 DIABETES MELLITUS WITHOUT COMPLICATION, UNSPECIFIED WHETHER LONG TERM INSULIN USE (HCC): ICD-10-CM

## 2019-08-03 LAB
ALBUMIN SERPL-MCNC: 4.2 G/DL (ref 3.4–5)
ALBUMIN/GLOB SERPL: 1.2 {RATIO} (ref 1–2)
ALP LIVER SERPL-CCNC: 50 U/L (ref 45–117)
ALT SERPL-CCNC: 57 U/L (ref 16–61)
ANION GAP SERPL CALC-SCNC: 7 MMOL/L (ref 0–18)
BASOPHILS # BLD AUTO: 0.03 X10(3) UL (ref 0–0.2)
BASOPHILS NFR BLD AUTO: 0.7 %
BILIRUB SERPL-MCNC: 0.4 MG/DL (ref 0.1–2)
BUN BLD-MCNC: 15 MG/DL (ref 7–18)
BUN/CREAT SERPL: 13.9 (ref 10–20)
CALCIUM BLD-MCNC: 8.9 MG/DL (ref 8.5–10.1)
CHLORIDE SERPL-SCNC: 108 MMOL/L (ref 98–112)
CHOLEST SMN-MCNC: 163 MG/DL (ref ?–200)
CO2 SERPL-SCNC: 26 MMOL/L (ref 21–32)
CREAT BLD-MCNC: 1.08 MG/DL (ref 0.7–1.3)
CREAT UR-SCNC: 77.4 MG/DL
DEPRECATED RDW RBC AUTO: 47.8 FL (ref 35.1–46.3)
EOSINOPHIL # BLD AUTO: 0.13 X10(3) UL (ref 0–0.7)
EOSINOPHIL NFR BLD AUTO: 3.1 %
ERYTHROCYTE [DISTWIDTH] IN BLOOD BY AUTOMATED COUNT: 13.6 % (ref 11–15)
EST. AVERAGE GLUCOSE BLD GHB EST-MCNC: 134 MG/DL (ref 68–126)
GLOBULIN PLAS-MCNC: 3.4 G/DL (ref 2.8–4.4)
GLUCOSE BLD-MCNC: 122 MG/DL (ref 70–99)
HBA1C MFR BLD HPLC: 6.3 % (ref ?–5.7)
HCT VFR BLD AUTO: 42.6 % (ref 39–53)
HDLC SERPL-MCNC: 31 MG/DL (ref 40–59)
HGB BLD-MCNC: 14.3 G/DL (ref 13–17.5)
IMM GRANULOCYTES # BLD AUTO: 0.03 X10(3) UL (ref 0–1)
IMM GRANULOCYTES NFR BLD: 0.7 %
LDLC SERPL CALC-MCNC: 72 MG/DL (ref ?–100)
LYMPHOCYTES # BLD AUTO: 1.34 X10(3) UL (ref 1–4)
LYMPHOCYTES NFR BLD AUTO: 32 %
M PROTEIN MFR SERPL ELPH: 7.6 G/DL (ref 6.4–8.2)
MCH RBC QN AUTO: 31.9 PG (ref 26–34)
MCHC RBC AUTO-ENTMCNC: 33.6 G/DL (ref 31–37)
MCV RBC AUTO: 95.1 FL (ref 80–100)
MICROALBUMIN UR-MCNC: 7.84 MG/DL
MICROALBUMIN/CREAT 24H UR-RTO: 101.3 UG/MG (ref ?–30)
MONOCYTES # BLD AUTO: 0.51 X10(3) UL (ref 0.1–1)
MONOCYTES NFR BLD AUTO: 12.2 %
NEUTROPHILS # BLD AUTO: 2.15 X10 (3) UL (ref 1.5–7.7)
NEUTROPHILS # BLD AUTO: 2.15 X10(3) UL (ref 1.5–7.7)
NEUTROPHILS NFR BLD AUTO: 51.3 %
NONHDLC SERPL-MCNC: 132 MG/DL (ref ?–130)
OSMOLALITY SERPL CALC.SUM OF ELEC: 294 MOSM/KG (ref 275–295)
PATIENT FASTING: YES
PATIENT FASTING: YES
PLATELET # BLD AUTO: 146 10(3)UL (ref 150–450)
PSA SERPL-MCNC: 0.11 NG/ML (ref ?–4)
RBC # BLD AUTO: 4.48 X10(6)UL (ref 4.3–5.7)
SODIUM SERPL-SCNC: 141 MMOL/L (ref 136–145)
TRIGL SERPL-MCNC: 300 MG/DL (ref 30–149)
TSI SER-ACNC: 1.57 MIU/ML (ref 0.36–3.74)
VLDLC SERPL CALC-MCNC: 60 MG/DL (ref 0–30)
WBC # BLD AUTO: 4.2 X10(3) UL (ref 4–11)

## 2019-08-03 PROCEDURE — 84153 ASSAY OF PSA TOTAL: CPT

## 2019-08-03 PROCEDURE — 80061 LIPID PANEL: CPT

## 2019-08-03 PROCEDURE — 82043 UR ALBUMIN QUANTITATIVE: CPT

## 2019-08-03 PROCEDURE — 85060 BLOOD SMEAR INTERPRETATION: CPT

## 2019-08-03 PROCEDURE — 85025 COMPLETE CBC W/AUTO DIFF WBC: CPT

## 2019-08-03 PROCEDURE — 36415 COLL VENOUS BLD VENIPUNCTURE: CPT

## 2019-08-03 PROCEDURE — 84443 ASSAY THYROID STIM HORMONE: CPT

## 2019-08-03 PROCEDURE — 83036 HEMOGLOBIN GLYCOSYLATED A1C: CPT

## 2019-08-03 PROCEDURE — 82570 ASSAY OF URINE CREATININE: CPT

## 2019-08-03 PROCEDURE — 80053 COMPREHEN METABOLIC PANEL: CPT

## 2019-08-06 PROBLEM — E11.29 MICROALBUMINURIA DUE TO TYPE 2 DIABETES MELLITUS (HCC): Status: ACTIVE | Noted: 2019-08-06

## 2019-08-06 PROBLEM — E11.29 MICROALBUMINURIA DUE TO TYPE 2 DIABETES MELLITUS: Status: ACTIVE | Noted: 2019-08-06

## 2019-08-06 PROBLEM — R80.9 MICROALBUMINURIA DUE TO TYPE 2 DIABETES MELLITUS: Status: ACTIVE | Noted: 2019-08-06

## 2019-08-06 PROBLEM — R80.9 MICROALBUMINURIA DUE TO TYPE 2 DIABETES MELLITUS (HCC): Status: ACTIVE | Noted: 2019-08-06

## 2019-08-09 ENCOUNTER — PATIENT MESSAGE (OUTPATIENT)
Dept: INTERNAL MEDICINE CLINIC | Facility: CLINIC | Age: 64
End: 2019-08-09

## 2019-08-09 DIAGNOSIS — E78.2 MIXED DYSLIPIDEMIA: ICD-10-CM

## 2019-08-09 DIAGNOSIS — E78.00 PURE HYPERCHOLESTEROLEMIA: ICD-10-CM

## 2019-08-09 RX ORDER — GABAPENTIN 300 MG/1
CAPSULE ORAL
Qty: 180 CAPSULE | Refills: 1 | Status: CANCELLED | OUTPATIENT
Start: 2019-08-09

## 2019-08-09 RX ORDER — ATORVASTATIN CALCIUM 80 MG/1
80 TABLET, FILM COATED ORAL
Qty: 90 TABLET | Refills: 1 | Status: CANCELLED | OUTPATIENT
Start: 2019-08-09

## 2019-08-09 RX ORDER — FENOFIBRATE 160 MG/1
TABLET ORAL
Qty: 90 TABLET | Refills: 1 | Status: CANCELLED | OUTPATIENT
Start: 2019-08-09

## 2019-08-09 RX ORDER — FAMOTIDINE 40 MG/1
40 TABLET, FILM COATED ORAL
Qty: 90 TABLET | Refills: 1 | Status: CANCELLED | OUTPATIENT
Start: 2019-08-09

## 2019-08-09 NOTE — TELEPHONE ENCOUNTER
From: Mel Foote  To: Bill Leon MD  Sent: 8/9/2019 3:30 PM CDT  Subject: Other    I sent a refill request through [address] [place] [address], not knowing that the only time I should send it , is when I have no refills left .  I thought I could get refills on all my m

## 2019-08-09 NOTE — TELEPHONE ENCOUNTER
Patient notified via [employer] that medications requested are available at the [facility].

## 2019-08-12 ENCOUNTER — TELEPHONE (OUTPATIENT)
Dept: INTERNAL MEDICINE CLINIC | Facility: CLINIC | Age: 64
End: 2019-08-12

## 2019-08-12 NOTE — TELEPHONE ENCOUNTER
Pt would like a copy of the 's note for his px on (8/1/19). Please put it on [employer] or mail it to him. Thank you.

## 2019-08-28 ENCOUNTER — OFFICE VISIT (OUTPATIENT)
Dept: PULMONOLOGY | Facility: CLINIC | Age: 64
End: 2019-08-28

## 2019-08-28 VITALS
BODY MASS INDEX: 32.44 KG/M2 | RESPIRATION RATE: 18 BRPM | OXYGEN SATURATION: 97 % | SYSTOLIC BLOOD PRESSURE: 122 MMHG | DIASTOLIC BLOOD PRESSURE: 77 MMHG | WEIGHT: 219 LBS | HEART RATE: 65 BPM | HEIGHT: 69 IN

## 2019-08-28 DIAGNOSIS — Z99.89 OSA ON CPAP: Primary | ICD-10-CM

## 2019-08-28 DIAGNOSIS — G47.33 OSA ON CPAP: Primary | ICD-10-CM

## 2019-08-28 PROCEDURE — 99214 OFFICE O/P EST MOD 30 MIN: CPT | Performed by: INTERNAL MEDICINE

## 2019-09-04 ENCOUNTER — OFFICE VISIT (OUTPATIENT)
Dept: PODIATRY CLINIC | Facility: CLINIC | Age: 64
End: 2019-09-04

## 2019-09-04 DIAGNOSIS — E11.9 DIABETES MELLITUS TYPE 2 WITHOUT RETINOPATHY (HCC): Primary | ICD-10-CM

## 2019-09-04 PROCEDURE — 99213 OFFICE O/P EST LOW 20 MIN: CPT | Performed by: PODIATRIST

## 2019-09-04 NOTE — PROGRESS NOTES
HPI:    Patient ID: Franco Hussein is a 66year old male. 37-year-old diabetic presents today for a diabetic foot evaluation I have not seen this patient in 2 years. He saw Aroldo Enriquez August 1, 2019.   His most recent A1c was 6.3 and he admits to not chec USE TWICE DAILY Disp: 102 each Rfl: 2   Dermatological Products, Misc. (RESINOL) 55-2 % Apply Externally Ointment Apply to rectal area as directed per MD. Disp: 1 each Rfl: 3   Ascorbic Acid (VITAMIN C) 100 MG Oral Tab Take  by mouth.  Disp:  Rfl:    aspiri encounter       Imaging & Referrals:  None       VS#[id number]

## 2019-09-05 ENCOUNTER — APPOINTMENT (OUTPATIENT)
Dept: LAB | Facility: HOSPITAL | Age: 64
End: 2019-09-05
Attending: INTERNAL MEDICINE
Payer: COMMERCIAL

## 2019-09-12 ENCOUNTER — AUDIOLOGY DOCUMENTATION (OUTPATIENT)
Dept: AUDIOLOGY | Facility: CLINIC | Age: 64
End: 2019-09-12

## 2019-09-12 NOTE — PROGRESS NOTES
Hearing Aid Information       Right    Left   Make: Oticon Make: Oticon   Model: OPN 2 mini RITE Model: OPN 2 mini RITE   Serial Number: [id number] Serial Number: [id number]   Date of Purchase: 05/17/18 Date of Purchase: 05/17/18   Repair Warranty Exp: 06/03

## 2019-09-23 ENCOUNTER — OFFICE VISIT (OUTPATIENT)
Dept: INTERNAL MEDICINE CLINIC | Facility: CLINIC | Age: 64
End: 2019-09-23

## 2019-09-23 VITALS
TEMPERATURE: 98 F | DIASTOLIC BLOOD PRESSURE: 72 MMHG | BODY MASS INDEX: 32 KG/M2 | HEART RATE: 65 BPM | SYSTOLIC BLOOD PRESSURE: 130 MMHG | HEIGHT: 69 IN

## 2019-09-23 DIAGNOSIS — M25.562 ACUTE PAIN OF LEFT KNEE: Primary | ICD-10-CM

## 2019-09-23 PROCEDURE — 99213 OFFICE O/P EST LOW 20 MIN: CPT | Performed by: PHYSICIAN ASSISTANT

## 2019-09-23 RX ORDER — HYDROCODONE BITARTRATE AND ACETAMINOPHEN 5; 325 MG/1; MG/1
1 TABLET ORAL EVERY 6 HOURS PRN
COMMUNITY
End: 2019-11-13

## 2019-09-23 RX ORDER — METHYLPREDNISOLONE 4 MG/1
TABLET ORAL
Qty: 1 KIT | Refills: 0 | Status: SHIPPED | OUTPATIENT
Start: 2019-09-23 | End: 2019-10-11 | Stop reason: ALTCHOICE

## 2019-09-23 NOTE — PROGRESS NOTES
HPI:    Patient ID: Rick Rowland is a 66year old male. HPI   Patient presents for left knee and leg pain. Was in [facility] walking over the weekend, and pain started. No trauma to the knee, no falls or slips.  Describes pain as stabbing and intermit 180 capsule Rfl: 1   FENOFIBRATE 160 MG Oral Tab TAKE 1 TABLET(160 MG) BY MOUTH EVERY DAY Disp: 90 tablet Rfl: 1   ATORVASTATIN 80 MG Oral Tab TAKE 1 TABLET BY MOUTH EVERY NIGHT AT BEDTIME Disp: 90 tablet Rfl: 1   Omega-3 Fatty Acids (FISH OIL TRIPLE STREN t&A   • Umbilical hernia 4544     Social History    Tobacco Use      Smoking status: Current Every Day Smoker        Packs/day: 1.00        Years: 20.00        Pack years: 20        Types: Cigarettes      Smokeless tobacco: Current User    Alcohol use: Yes Disorder Mother         Thyroid disease   • Stroke Mother    • Diabetes Sister    • Stroke Brother         CVA   • Diabetes Brother    • Hypertension Brother    • Diabetes Other         type 1, fam hx   • Glaucoma Neg    • Retinal detachment Neg          P kit 0     Sig: As directed.        Imaging & Referrals:  ORTHOPEDIC - INTERNAL         REBECA#[id number]

## 2019-09-30 ENCOUNTER — TELEPHONE (OUTPATIENT)
Dept: INTERNAL MEDICINE CLINIC | Facility: CLINIC | Age: 64
End: 2019-09-30

## 2019-09-30 ENCOUNTER — PATIENT MESSAGE (OUTPATIENT)
Dept: INTERNAL MEDICINE CLINIC | Facility: CLINIC | Age: 64
End: 2019-09-30

## 2019-09-30 NOTE — TELEPHONE ENCOUNTER
Followed up with patient, reports recent follow up appt 9/23/19 for post ED follow up for knee pain, recently completed a medrol dose pack and is currently taking Ibuprofen several times daily, also using icy hot to knee with mild improvement.  Continues wi

## 2019-09-30 NOTE — TELEPHONE ENCOUNTER
From: Stacie Bhatti  To: Monet Herrera PA-C  Sent: 9/30/2019 8:42 AM CDT  Subject: Other    Once again, I have been in excruciating pain, the ibuprofen helps a little, but I don't want to take too much of it , should I just tuff it out ?  I will see the

## 2019-09-30 NOTE — TELEPHONE ENCOUNTER
From: Deepak Garza  To: Catherine Krishnamurthy PA-C  Sent: 9/30/2019  8:42 AM CDT  Subject: Other    Once again, I have been in excruciating pain, the ibuprofen helps a little, but I don't want to take too much of it , should I just tuff it out ?  I will see the

## 2019-09-30 NOTE — TELEPHONE ENCOUNTER
Advised patient of CHILANGO Bolton's  note. Patient verbalized understanding and had no further questions. Advised continue with ice/ heat.

## 2019-10-03 ENCOUNTER — OFFICE VISIT (OUTPATIENT)
Dept: ORTHOPEDICS CLINIC | Facility: CLINIC | Age: 64
End: 2019-10-03

## 2019-10-03 ENCOUNTER — APPOINTMENT (OUTPATIENT)
Dept: LAB | Facility: HOSPITAL | Age: 64
End: 2019-10-03
Attending: INTERNAL MEDICINE
Payer: COMMERCIAL

## 2019-10-03 VITALS — WEIGHT: 214 LBS | BODY MASS INDEX: 31.7 KG/M2 | HEIGHT: 69 IN

## 2019-10-03 DIAGNOSIS — Z12.11 COLON CANCER SCREENING: ICD-10-CM

## 2019-10-03 DIAGNOSIS — M25.562 ACUTE PAIN OF LEFT KNEE: Primary | ICD-10-CM

## 2019-10-03 PROCEDURE — 99244 OFF/OP CNSLTJ NEW/EST MOD 40: CPT | Performed by: ORTHOPAEDIC SURGERY

## 2019-10-03 PROCEDURE — 82274 ASSAY TEST FOR BLOOD FECAL: CPT

## 2019-10-03 RX ORDER — METHYLPREDNISOLONE 4 MG/1
TABLET ORAL
Qty: 1 PACKAGE | Refills: 0 | Status: SHIPPED | OUTPATIENT
Start: 2019-10-03 | End: 2019-10-11 | Stop reason: ALTCHOICE

## 2019-10-03 NOTE — PROGRESS NOTES
NURSING INTAKE COMMENTS: Patient presents with:  Consult: C/o left knee pain since Friday 9/19. Recalls no recent injuries. Went [place] the [place] [address] where a left knee xray was completed on 9/20. Denies any swelling or numbness.   Has tried Norco and i [facility] ENDOSCOPY   • ESOPHAGOGASTRODUODENOSCOPY (EGD) N/A 2/6/2017    Performed by Sofía Bartholomew MD at [facility] N/A 10/31/2017    Performed by Sofía Bartholomew MD at [facility] MG Oral Tab TAKE 4 TABLETS BY MOUTH DAILY AT DINNER Disp: 360 tablet Rfl: 1   FAMOTIDINE 40 MG Oral Tab TAKE 1 TABLET BY MOUTH AT BEDTIME Disp: 90 tablet Rfl: 1   GABAPENTIN 300 MG Oral Cap TAKE 1 CAPSULE BY MOUTH TWICE DAILY Disp: 180 capsule Rfl: 1   FEN hyperlipidemia   • Thyroid Disorder Mother         Thyroid disease   • Stroke Mother    • Diabetes Sister    • Stroke Brother         CVA   • Diabetes Brother    • Hypertension Brother    • Diabetes Other         type 1, fam hx   • Glaucoma Neg    • Retina noted  Extremities: He walks without a limp. Further exam of the left knee reveals normal alignment. There is no effusion. No tenderness to palpation. Normal ligamentous exam.  Normal range of motion. No pain with rotational maneuvers.   No pain with p MD

## 2019-10-04 ENCOUNTER — AUDIOLOGY DOCUMENTATION (OUTPATIENT)
Dept: AUDIOLOGY | Facility: CLINIC | Age: 64
End: 2019-10-04

## 2019-10-04 NOTE — PROGRESS NOTES
Called patient that hearing aids are back from repair.    Note:  New serial numbers    Hearing aid information:   [employer] OPN 2-T  Right [id number]  Replaced with #[id number]  Left [id number]   Replaced with #[id number]  Dispensing date:  5-17-18  Warranty: 3 year

## 2019-10-07 DIAGNOSIS — E78.00 PURE HYPERCHOLESTEROLEMIA: ICD-10-CM

## 2019-10-07 DIAGNOSIS — E78.2 MIXED DYSLIPIDEMIA: ICD-10-CM

## 2019-10-09 RX ORDER — FENOFIBRATE 160 MG/1
TABLET ORAL
Qty: 90 TABLET | Refills: 1 | Status: SHIPPED | OUTPATIENT
Start: 2019-10-09 | End: 2020-02-21

## 2019-10-09 RX ORDER — ATORVASTATIN CALCIUM 80 MG/1
TABLET, FILM COATED ORAL
Qty: 90 TABLET | Refills: 1 | Status: SHIPPED | OUTPATIENT
Start: 2019-10-09 | End: 2020-02-20

## 2019-10-10 NOTE — TELEPHONE ENCOUNTER
Refill passed per [facility] protocol.   Cholesterol Medications  Protocol Criteria:  · Appointment scheduled in the past 12 months or in the next 3 months  · ALT & LDL on file in the past 12 months  · ALT result < 80  · LDL result <130   Recent Outpat

## 2019-10-11 ENCOUNTER — OFFICE VISIT (OUTPATIENT)
Dept: ORTHOPEDICS CLINIC | Facility: CLINIC | Age: 64
End: 2019-10-11

## 2019-10-11 ENCOUNTER — HOSPITAL ENCOUNTER (OUTPATIENT)
Dept: GENERAL RADIOLOGY | Facility: HOSPITAL | Age: 64
Discharge: HOME | End: 2019-10-11
Attending: ORTHOPAEDIC SURGERY
Payer: COMMERCIAL

## 2019-10-11 VITALS — WEIGHT: 214 LBS | HEIGHT: 70.8 IN | BODY MASS INDEX: 29.96 KG/M2

## 2019-10-11 DIAGNOSIS — M79.605 LEFT LEG PAIN: ICD-10-CM

## 2019-10-11 DIAGNOSIS — M54.16 LUMBAR RADICULOPATHY: Primary | ICD-10-CM

## 2019-10-11 PROCEDURE — 99213 OFFICE O/P EST LOW 20 MIN: CPT | Performed by: ORTHOPAEDIC SURGERY

## 2019-10-11 PROCEDURE — 72100 X-RAY EXAM L-S SPINE 2/3 VWS: CPT | Performed by: ORTHOPAEDIC SURGERY

## 2019-10-11 RX ORDER — TRAMADOL HYDROCHLORIDE 50 MG/1
50 TABLET ORAL EVERY 6 HOURS PRN
Qty: 30 TABLET | Refills: 0 | Status: SHIPPED | OUTPATIENT
Start: 2019-10-11 | End: 2019-10-18

## 2019-10-11 NOTE — PROGRESS NOTES
NURSING INTAKE COMMENTS: Patient presents with:  Knee Pain: Left f/u - states the medication helped a littl bit but after he finished the medication pain restarted into the knee and in the tigh and in the calf and sometimes has pain in the LS - rates pain FLEXIBLE SIGMOIDOSCOPY N/A 10/31/2017    Performed by Elie Peralta MD at [address] [place] [address] N/A 7/11/2017    Performed by Clay Kang MD at [facility] OR   • One Palmer Nye  06/24/14    Hemorrhoidectomy and rec TAKE 4 TABLETS BY MOUTH DAILY AT DINNER Disp: 360 tablet Rfl: 1   FAMOTIDINE 40 MG Oral Tab TAKE 1 TABLET BY MOUTH AT BEDTIME Disp: 90 tablet Rfl: 1   GABAPENTIN 300 MG Oral Cap TAKE 1 CAPSULE BY MOUTH TWICE DAILY Disp: 180 capsule Rfl: 1   Omega-3 Fatty A type 1, fam hx   • Glaucoma Neg    • Retinal detachment Neg        Social History    Occupational History      Not on file    Tobacco Use      Smoking status: Current Every Day Smoker        Packs/day: 1.00        Years: 20.00        Pack years: 21 There is tenderness in the left sciatic notch. Passive range of motion of the left hip produces minimal pain in the gluteal area. Neurological: Left lower extremity pinprick sensation is diminished over the medial and lateral leg.   There is mild atrophy

## 2019-10-18 DIAGNOSIS — M54.16 LUMBAR RADICULOPATHY: ICD-10-CM

## 2019-10-21 NOTE — TELEPHONE ENCOUNTER
S/w pt and he states he has been out of tramadol for a wk. He has been taking tylenol, but it's not really helping pain. He rates pain 7/10. He states he has tried ibuprofen/naproxen before, but it doesn't help as well as the tramadol.   He has MRI schedule

## 2019-10-23 ENCOUNTER — MED REC SCAN ONLY (OUTPATIENT)
Dept: INTERNAL MEDICINE CLINIC | Facility: CLINIC | Age: 64
End: 2019-10-23

## 2019-10-23 ENCOUNTER — IMMUNIZATION (OUTPATIENT)
Dept: INTERNAL MEDICINE CLINIC | Facility: CLINIC | Age: 64
End: 2019-10-23

## 2019-10-23 ENCOUNTER — HOSPITAL ENCOUNTER (OUTPATIENT)
Dept: MRI IMAGING | Facility: HOSPITAL | Age: 64
Discharge: HOME | End: 2019-10-23
Attending: ORTHOPAEDIC SURGERY
Payer: COMMERCIAL

## 2019-10-23 DIAGNOSIS — Z23 NEED FOR VACCINATION: ICD-10-CM

## 2019-10-23 DIAGNOSIS — M54.16 LUMBAR RADICULOPATHY: ICD-10-CM

## 2019-10-23 PROCEDURE — 90686 IIV4 VACC NO PRSV 0.5 ML IM: CPT | Performed by: INTERNAL MEDICINE

## 2019-10-23 PROCEDURE — 90471 IMMUNIZATION ADMIN: CPT | Performed by: INTERNAL MEDICINE

## 2019-10-23 PROCEDURE — 72148 MRI LUMBAR SPINE W/O DYE: CPT | Performed by: ORTHOPAEDIC SURGERY

## 2019-10-23 RX ORDER — TRAMADOL HYDROCHLORIDE 50 MG/1
TABLET ORAL
Qty: 30 TABLET | Refills: 0 | Status: SHIPPED | OUTPATIENT
Start: 2019-10-23 | End: 2019-11-01

## 2019-10-24 NOTE — TELEPHONE ENCOUNTER
Dr Thuy Gamez approved refill of Tramadol. Tramadol script faxed to [facility] on [address]. Confirmation of fax received. Spoke to pt and informed him that Thuy Gamez approved refill of Tramadol.  Verified pharmacy with pt and informed

## 2019-10-25 ENCOUNTER — OFFICE VISIT (OUTPATIENT)
Dept: ORTHOPEDICS CLINIC | Facility: CLINIC | Age: 64
End: 2019-10-25

## 2019-10-25 ENCOUNTER — LAB ENCOUNTER (OUTPATIENT)
Dept: LAB | Facility: HOSPITAL | Age: 64
End: 2019-10-25
Attending: INTERNAL MEDICINE
Payer: COMMERCIAL

## 2019-10-25 VITALS — WEIGHT: 214 LBS | HEIGHT: 70.8 IN | BODY MASS INDEX: 29.96 KG/M2

## 2019-10-25 DIAGNOSIS — M54.16 LUMBAR RADICULOPATHY: Primary | ICD-10-CM

## 2019-10-25 DIAGNOSIS — K74.60 CIRRHOSIS OF LIVER WITHOUT ASCITES, UNSPECIFIED HEPATIC CIRRHOSIS TYPE (HCC): ICD-10-CM

## 2019-10-25 PROCEDURE — 99213 OFFICE O/P EST LOW 20 MIN: CPT | Performed by: ORTHOPAEDIC SURGERY

## 2019-10-25 PROCEDURE — 82105 ALPHA-FETOPROTEIN SERUM: CPT

## 2019-10-25 PROCEDURE — 85025 COMPLETE CBC W/AUTO DIFF WBC: CPT

## 2019-10-25 PROCEDURE — 80053 COMPREHEN METABOLIC PANEL: CPT

## 2019-10-25 PROCEDURE — 36415 COLL VENOUS BLD VENIPUNCTURE: CPT

## 2019-10-25 NOTE — PROGRESS NOTES
NURSING INTAKE COMMENTS: Patient presents with:  Knee Pain: Left f/u and MRI of LS results - states the knee is better with the pain medication - has pain in the leg rated as 4/10 with weight bearing - no pain at rest      HPI: This 66year old male stephanie MAIN OR   • HEMORRHOIDECTOMY  06/24/14    Hemorrhoidectomy and rectal mucosqal proctopexy for correction of a significant rectal mucosal prolapse of the posterior and left side of the rectum.    • HEMORRHOIDECTOMY  1997   • HERNIA SURGERY  [address] OIL TRIPLE STRENGTH) 1400 MG Oral Cap, Take by mouth BID (Nitrates). , Disp: , Rfl:   ACCU-CHEK BEV PLUS In Vitro Strip, USE TWICE DAILY, Disp: 200 strip, Rfl: 0  ACCU-CHEK FASTCLIX LANCETS Does not apply Misc, USE TWICE DAILY, Disp: 102 each, Rfl: 2  [place] [address]        Types: Cigarettes      Smokeless tobacco: Current User    Substance and Sexual Activity      Alcohol use:  Yes        Alcohol/week: 0.0 standard drinks        Comment: 2 beers once a week      Drug use: No      Sexual activity: Not on file       R BONES: No fracture, pars defect, or osseous lesion. CORD/CAUDA EQUINA: Normal caliber, contour, and signal intensity. OTHER: Negative. LUMBAR DISC LEVELS: L1-L2: Mild disc degeneration without significant posterior disc contour abnormality or stenosis. noted. Otherwise no subluxation, fracture or visible bony lesion. DISC SPACES: Normal.  No significant disc space narrowing. SACROILIAC JOINTS: Normal.  OTHER: Vascular calcifications are noted. CONCLUSION:  1. Scoliosis. 2. Mild osteoarthritis.  3.

## 2019-10-29 ENCOUNTER — HOSPITAL ENCOUNTER (OUTPATIENT)
Dept: ULTRASOUND IMAGING | Age: 64
Discharge: HOME | End: 2019-10-29
Attending: INTERNAL MEDICINE
Payer: COMMERCIAL

## 2019-10-29 DIAGNOSIS — K74.60 CIRRHOSIS OF LIVER WITHOUT ASCITES, UNSPECIFIED HEPATIC CIRRHOSIS TYPE (HCC): ICD-10-CM

## 2019-10-29 PROCEDURE — 76705 ECHO EXAM OF ABDOMEN: CPT | Performed by: INTERNAL MEDICINE

## 2019-10-30 ENCOUNTER — PATIENT MESSAGE (OUTPATIENT)
Dept: ORTHOPEDICS CLINIC | Facility: CLINIC | Age: 64
End: 2019-10-30

## 2019-10-30 DIAGNOSIS — M54.16 LUMBAR RADICULOPATHY: ICD-10-CM

## 2019-10-31 NOTE — TELEPHONE ENCOUNTER
From: [facility] Hilda  To: Melinda Bill MD  Sent: 10/30/2019 1:48 PM CDT  Subject: Non-Urgent Medical Question    Would it be possible to get a refill on Tramadol , I have enough for two days. I see Dr Vikki Mccain on November 13th 2019 .  The pain has lin

## 2019-11-01 RX ORDER — TRAMADOL HYDROCHLORIDE 50 MG/1
TABLET ORAL
Qty: 30 TABLET | Refills: 0 | OUTPATIENT
Start: 2019-11-01 | End: 2019-11-19

## 2019-11-06 ENCOUNTER — TELEPHONE (OUTPATIENT)
Dept: GASTROENTEROLOGY | Facility: CLINIC | Age: 64
End: 2019-11-06

## 2019-11-06 DIAGNOSIS — R93.2 ABNORMAL LIVER ULTRASOUND: Primary | ICD-10-CM

## 2019-11-06 RX ORDER — LORAZEPAM 1 MG/1
1 TABLET ORAL ONCE
Qty: 1 TABLET | Refills: 0 | Status: SHIPPED | OUTPATIENT
Start: 2019-11-06 | End: 2019-11-06

## 2019-11-07 RX ORDER — FAMOTIDINE 40 MG/1
TABLET, FILM COATED ORAL
Qty: 90 TABLET | Refills: 0 | Status: SHIPPED | OUTPATIENT
Start: 2019-11-07 | End: 2020-02-20

## 2019-11-07 RX ORDER — GABAPENTIN 300 MG/1
CAPSULE ORAL
Qty: 180 CAPSULE | Refills: 0 | Status: SHIPPED | OUTPATIENT
Start: 2019-11-07 | End: 2020-02-06

## 2019-11-07 NOTE — TELEPHONE ENCOUNTER
MRI has been authorized.   Pt called and informed PA was approved for MRI and to call [phone] to schedule ASAP and also ensure he has a  to procedure and home as Dr. Gretta Linares ordered him Lorazepam 1 mg to take 30 - 60 mins prior to MRI and this RX will

## 2019-11-07 NOTE — TELEPHONE ENCOUNTER
I spoke to RAZIA. I have reviewed his ultrasound with radiology. Although the area in question may be benign, in light of the patient's history of cirrhosis, an MRI is recommended. I have entered the order.   I have also given the patient 1 mg of loraz

## 2019-11-11 NOTE — PROGRESS NOTES
HPI: This 66year old male presents today with complaints of left lower extremity pain. He is had persistent pain mainly over the lateral aspect of the leg and knee extending to the hip and lower back. Symptoms been present for more than 6 weeks.   He had

## 2019-11-13 ENCOUNTER — OFFICE VISIT (OUTPATIENT)
Dept: PAIN CLINIC | Facility: HOSPITAL | Age: 64
End: 2019-11-13
Attending: ORTHOPAEDIC SURGERY
Payer: COMMERCIAL

## 2019-11-13 ENCOUNTER — OFFICE VISIT (OUTPATIENT)
Dept: GASTROENTEROLOGY | Facility: CLINIC | Age: 64
End: 2019-11-13

## 2019-11-13 VITALS
HEIGHT: 70 IN | SYSTOLIC BLOOD PRESSURE: 134 MMHG | BODY MASS INDEX: 30.64 KG/M2 | HEART RATE: 71 BPM | DIASTOLIC BLOOD PRESSURE: 73 MMHG | WEIGHT: 214 LBS

## 2019-11-13 VITALS
BODY MASS INDEX: 30.64 KG/M2 | SYSTOLIC BLOOD PRESSURE: 150 MMHG | RESPIRATION RATE: 18 BRPM | WEIGHT: 214 LBS | HEART RATE: 83 BPM | DIASTOLIC BLOOD PRESSURE: 84 MMHG | HEIGHT: 70 IN

## 2019-11-13 DIAGNOSIS — M54.16 LUMBAR RADICULOPATHY: ICD-10-CM

## 2019-11-13 DIAGNOSIS — K75.81 NONALCOHOLIC STEATOHEPATITIS (NASH): ICD-10-CM

## 2019-11-13 DIAGNOSIS — K74.60 CIRRHOSIS OF LIVER WITHOUT ASCITES, UNSPECIFIED HEPATIC CIRRHOSIS TYPE (HCC): Primary | ICD-10-CM

## 2019-11-13 DIAGNOSIS — M51.26 HNP (HERNIATED NUCLEUS PULPOSUS), LUMBAR: Primary | ICD-10-CM

## 2019-11-13 DIAGNOSIS — R93.2 ABNORMAL LIVER ULTRASOUND: ICD-10-CM

## 2019-11-13 PROCEDURE — 99213 OFFICE O/P EST LOW 20 MIN: CPT | Performed by: INTERNAL MEDICINE

## 2019-11-13 PROCEDURE — 99201 HC OUTPT EVAL AND MGNT NEW PT LEVEL 1: CPT

## 2019-11-13 RX ORDER — METHYLPREDNISOLONE 4 MG/1
4 TABLET ORAL ONCE
Qty: 1 PACKAGE | Refills: 0 | Status: SHIPPED | OUTPATIENT
Start: 2019-11-13 | End: 2019-11-13

## 2019-11-13 NOTE — CHRONIC PAIN
Initial Consultation Note      HISTORY OF PRESENT ILLNESS:  Luis Alberto Tenorio is a 60year old old male referred to the pain clinic  for for evaluation treatment of his low back pain which radiates down to his left leg Alicia cole 33-year-old jacinto santizo metFORMIN HCl 500 MG Oral Tab TAKE 4 TABLETS BY MOUTH DAILY AT DINNER 360 tablet 1   • Omega-3 Fatty Acids (FISH OIL TRIPLE STRENGTH) 1400 MG Oral Cap Take by mouth BID (Nitrates).      • ACCU-CHEK BEV PLUS In Vitro Strip USE TWICE DAILY 200 strip 0   • A MD at [facility] MAIN OR   • HEMORRHOIDECTOMY  06/24/14    Hemorrhoidectomy and rectal mucosqal proctopexy for correction of a significant rectal mucosal prolapse of the posterior and left side of the rectum.    • HEMORRHOIDECTOMY  1997   • HERNIA SURGERY  2012 apnea     uses cpap machine   • Tonsillitis 1994    nasal septoplasty, t&A   • Umbilical hernia 3028       FAMILY HISTORY:  Family History   Problem Relation Age of Onset   • Heart Surgery Father         CABG   • Diabetes Father 64        type 1 (cause of Attends meetings of clubs or organizations: Not on file        Relationship status: Not on file      Intimate partner violence:        Fear of current or ex partner: Not on file        Emotionally abused: Not on file        Physically abused: Not on file Dorsalis Pedis 2/4 2/4   Posterior Tibial 2/4 2/4   Brachial 2/4 2/4     SLR: negative  SIJ tenderness negative  Joint Exam: Normal  TPs:  Present within lumbo-sacral paraspinal muscles    Right Deep tendon reflexes: Normal   Left Deep tendon reflexes: 1 1.1 x 0.9 x 1.5 cm region in the left hepatic lobe is again identified. In the right hepatic lobe, there is a 0.6 x 0.6 x 0.7 cm well-circumscribed hypoechogenic lesion. There is no intrahepatic biliary ductal dilatation.   GALLBLADDER/CBD: No echogenic, sh utilized for visualization of suspected pathology. FINDINGS:  PARASPINAL AREA: Normal with no visible mass. BONES: No fracture, pars defect, or osseous lesion. CORD/CAUDA EQUINA: Normal caliber, contour, and signal intensity. OTHER: Negative.   LUMBAR D Component Value Date     10/25/2019    K 4.5 10/25/2019     10/25/2019    CO2 30.0 10/25/2019    BUN 20 (H) 10/25/2019     (H) 10/25/2019    CA 9.5 10/25/2019     Lab Results   Component Value Date    PT 12.9 12/14/2015       [place]

## 2019-11-13 NOTE — PROGRESS NOTES
11/13/19  INITIAL CONSULT  PRESENTS AMBULATORY TO CPM;  NEW CONSULT C/O 2 1/2 MO  HX OF LBP  RADIATING DOWN THE LLE; PT REPORTS AT FIRST IT STARTED A CALF PAIN; DVR WAS NEGATIVE;  NOW HE IS HAVING SPASMS  RATES HIS PAIN 2/10;   REFERRED BY DR. Cory Bloch;  EX

## 2019-11-16 NOTE — PATIENT INSTRUCTIONS
1.  I will let you know the results of the MRI. 2.  Control weight and blood sugar. 3.  Stop smoking and preferably stop alcohol as well! 4. You are due for an upper endoscopy in April 2022 and a colonoscopy in January 2023.

## 2019-11-16 NOTE — PROGRESS NOTES
HPI:    Patient ID: Anastacia Horton is a 60year old male. HPI  Elle Spaulding returns in follow-up. He was last seen at endoscopy in April 2019 and in the office in January 2019.     As per previous notes Elle Spaulding has a history of elevated liver chemistry tests wit He limits alcohol use to #2 beers if going out with friends. The patient had a recent liver ultrasound performed for [facility] screening. A stable appearance to the left lobe of the liver was noted.   There was a 6 mm hypo-echogenic lesion in the right lobe o ACCU-CHEK FASTCLIX LANCETS Does not apply Misc USE TWICE DAILY 102 each 2   • Dermatological Products, Misc.  (RESINOL) 55-2 % Apply Externally Ointment Apply to rectal area as directed per MD. 1 each 3   • Ascorbic Acid (VITAMIN C) 100 MG Oral Tab Take  by Nursing note and vitals reviewed. Component      Latest Ref Rng & Units 10/25/2019   WBC      4.0 - 11.0 x10(3) uL 3.8 (L)   RBC      4.30 - 5.70 x10(6)uL 4.49   Hemoglobin      13.0 - 17.5 g/dL 14.0   Hematocrit      39.0 - 53.0 % 42.0   MCV      [facility]. [facility] CONTRAST, 3/06/2013, 12:08. CT ABDOMEN & PELVIS W CON, 8/14/2012, 18:53. CT ABDOMEN & PELVIS W CON, 9/07/2011, 12:18. CT ABDOMEN W CONTRAST, 1/08/2012, 9:24.   [facility], CT PF ABD [address],   8/27/2010, 15:56.  US KIDNEY, 1/ collecting system dilatation. There are no visible calculi. PANCREAS:      Visualized portions of the pancreatic head and body have a grossly unremarkable sonographic appearance. Views of the tail are obscured by intervening bowel gas.   OTHER: leukoplakia  The patient is up-to-date with surveillance. He would be due for a surveillance upper endoscopy in April 2022. Smoking cessation advised.     Personal history of adenomatous colon polyps  The patient is up-to-date with colonoscopic surveillan

## 2019-11-19 DIAGNOSIS — M54.16 LUMBAR RADICULOPATHY: ICD-10-CM

## 2019-11-19 NOTE — TELEPHONE ENCOUNTER
Pharmacy requesting refill of Tramadol 50 mg for pt. LOV was on 10/25/19. Tramadol Rx'd on 11/01/19 with quantity of 30 and NRF. -- Dr. Hannah [place] and Alyce [place], do you approve refill of Tramadol?

## 2019-11-20 RX ORDER — TRAMADOL HYDROCHLORIDE 50 MG/1
TABLET ORAL
Qty: 30 TABLET | Refills: 0 | Status: SHIPPED | OUTPATIENT
Start: 2019-11-20 | End: 2019-12-05

## 2019-11-22 ENCOUNTER — OFFICE VISIT (OUTPATIENT)
Dept: OPTOMETRY | Facility: CLINIC | Age: 64
End: 2019-11-22

## 2019-11-22 ENCOUNTER — HOSPITAL ENCOUNTER (OUTPATIENT)
Dept: MRI IMAGING | Facility: HOSPITAL | Age: 64
Discharge: HOME | End: 2019-11-22
Attending: INTERNAL MEDICINE
Payer: COMMERCIAL

## 2019-11-22 DIAGNOSIS — H25.13 AGE-RELATED NUCLEAR CATARACT OF BOTH EYES: ICD-10-CM

## 2019-11-22 DIAGNOSIS — E11.9 TYPE 2 DIABETES MELLITUS WITHOUT COMPLICATION, WITHOUT LONG-TERM CURRENT USE OF INSULIN (HCC): Primary | ICD-10-CM

## 2019-11-22 DIAGNOSIS — R93.2 ABNORMAL LIVER ULTRASOUND: ICD-10-CM

## 2019-11-22 PROCEDURE — A9575 INJ GADOTERATE MEGLUMI 0.1ML: HCPCS | Performed by: INTERNAL MEDICINE

## 2019-11-22 PROCEDURE — 92014 COMPRE OPH EXAM EST PT 1/>: CPT | Performed by: OPTOMETRIST

## 2019-11-22 PROCEDURE — 74183 MRI ABD W/O CNTR FLWD CNTR: CPT | Performed by: INTERNAL MEDICINE

## 2019-11-22 NOTE — PROGRESS NOTES
Tala Spangler is a 60year old male. HPI:     HPI     Diabetic Eye Exam     Diabetes characteristics include Type 2, controlled with diet and taking oral medications. Duration of 6 years. Number of years diabetic 6. Number of years on pills 6.   Nu Location: [facility] ENDOSCOPY); other (Prostate Surgery on 7/11/2017); and colonoscopy (N/A, 1/23/2018) (Procedure: COLONOSCOPY;  Surgeon: Otf Luciano MD;  Location: [facility]).     Family History   Problem Relation Age of Onset   • Heart Surgery Fa Potassium 50 MG Oral Tab Take 1 tablet (50 mg total) by mouth daily. 90 tablet 3   • finasteride 5 MG Oral Tab Take 1 tablet (5 mg total) by mouth daily.  90 tablet 3   • metFORMIN HCl 500 MG Oral Tab TAKE 4 TABLETS BY MOUTH DAILY AT DINNER 360 tablet 1   • PERRL          Visual Fields       Left Right     Full Full          Extraocular Movement       Right Left     Full, Ortho Full, Ortho          Neuro/Psych     Oriented x3:  Yes    Mood/Affect:  Normal          Dilation     Both eyes:  1.0% Mydriacyl and 2 yearly diabetic eye exams. Patient has been advised to call immediately if they notice any changes or problems with their vision       No orders of the defined types were placed in this encounter.       Meds This Visit:  Requested Prescriptions      No pres

## 2019-11-26 ENCOUNTER — DOCUMENTATION ONLY (OUTPATIENT)
Dept: PAIN CLINIC | Facility: HOSPITAL | Age: 64
End: 2019-11-26

## 2019-11-26 NOTE — PROGRESS NOTES
Procedure code 32525--[id number] 12/11/19 PENDING APPROVAL     PA need via P     All clinical submitted via [facility] website    Pending ref # [id number]    Once approval has been rcv'd writer will fax order form over to the surgery center.

## 2019-12-05 ENCOUNTER — TELEPHONE (OUTPATIENT)
Dept: PULMONOLOGY | Facility: CLINIC | Age: 64
End: 2019-12-05

## 2019-12-05 DIAGNOSIS — M54.16 LUMBAR RADICULOPATHY: ICD-10-CM

## 2019-12-05 DIAGNOSIS — G47.33 OSA (OBSTRUCTIVE SLEEP APNEA): Primary | ICD-10-CM

## 2019-12-05 NOTE — TELEPHONE ENCOUNTER
Pt states he needs referral his CPAP supplies sent to [facility] [employer]- pls call pt when sent

## 2019-12-05 NOTE — TELEPHONE ENCOUNTER
Spoke with pt. Pt requesting referral for CPAP supplies. Verified DME company ([employer]). LOV 8/28/19. Order faxed to  Lillian Ley. Fax confirmation received.

## 2019-12-06 ENCOUNTER — DOCUMENTATION ONLY (OUTPATIENT)
Dept: PAIN CLINIC | Facility: HOSPITAL | Age: 64
End: 2019-12-06

## 2019-12-06 NOTE — TELEPHONE ENCOUNTER
Refill passed per [facility] protocol.   Diabetes Medications  Protocol Criteria:  · Appointment scheduled in the past 6 months or the next 3 months  · A1C < 7.5 in the past 6 months  · Creatinine in the past 12 months  · Creatinine result < 1.5   Rece

## 2019-12-06 NOTE — PROGRESS NOTES
Procedure code 10716--[id number] 12/11/19  APPROVED    Approval rcv'd via [facility] #  [id number]    Valid from 11/27/2019--02/24/20    Order form faxed to the surgery center, confirmation rcv'd

## 2019-12-08 DIAGNOSIS — M54.16 LUMBAR RADICULOPATHY: ICD-10-CM

## 2019-12-11 RX ORDER — TRAMADOL HYDROCHLORIDE 50 MG/1
TABLET ORAL
Qty: 30 TABLET | Refills: 0 | OUTPATIENT
Start: 2019-12-11

## 2019-12-11 RX ORDER — TRAMADOL HYDROCHLORIDE 50 MG/1
TABLET ORAL
Qty: 30 TABLET | Refills: 0 | Status: SHIPPED | OUTPATIENT
Start: 2019-12-11

## 2019-12-11 NOTE — TELEPHONE ENCOUNTER
Spoke with April at [facility]) regarding status of CPAP order, April states [facility] authorization with codes needed. Pt informed of this and given [facility]'s number to follow-up [phone].     Managed Care- Can you process O authorizati

## 2019-12-11 NOTE — TELEPHONE ENCOUNTER
Dr Fermin Luz and Jerzy Smith - [facility] advise on refill request  LOV 10/25/19  Prescription refilled last on 11/20/19 #30 (first on 10/11, 10/23, and 11/1/19)    No f/u appts are scheduled at this time

## 2019-12-23 ENCOUNTER — TELEPHONE (OUTPATIENT)
Dept: PULMONOLOGY | Facility: CLINIC | Age: 64
End: 2019-12-23

## 2019-12-23 NOTE — TELEPHONE ENCOUNTER
Pt states he has been waiting for CPAP supplies from [place] and still has not received. Please call.

## 2019-12-23 NOTE — TELEPHONE ENCOUNTER
Carlos Louis from [email or web address] [phone] states they have not received referral for CPAP supplies. Referrral authorized on 12/19/19 faxed to [facility]. Fax confirmation received. Pt informed of this. Pt verbalized understanding.

## 2020-01-07 DIAGNOSIS — M54.16 LUMBAR RADICULOPATHY: ICD-10-CM

## 2020-01-13 RX ORDER — TRAMADOL HYDROCHLORIDE 50 MG/1
TABLET ORAL
Qty: 30 TABLET | Refills: 0 | OUTPATIENT
Start: 2020-01-13

## 2020-01-13 NOTE — TELEPHONE ENCOUNTER
[facility] Tramadol was refill by on call physician, Dr. Wisam Mejia.

## 2020-01-13 NOTE — TELEPHONE ENCOUNTER
I would not recommend chronic use of narcotic pain medication (Tramadol).  If patient continues to have significant pain requiring use of Tramadol consistently, he should schedule a follow-up appointment with Dr. Nolan Walter or follow through with the pain cli

## 2020-01-31 RX ORDER — TRAMADOL HYDROCHLORIDE 50 MG/1
50 TABLET ORAL EVERY 6 HOURS PRN
Qty: 30 TABLET | Refills: 0 | OUTPATIENT
Start: 2020-01-31

## 2020-01-31 NOTE — TELEPHONE ENCOUNTER
Patient has not been seen in the office since October of 2019. We are unable to continue to prescribe narcotic pain medications without at least intermittent follow-up from the patient. I also recommend avoidance of chronic use of narcotic pain medication (Tramadol). If patient continues to have significant pain requiring use of Tramadol consistently, he should schedule a follow-up appointment with Dr. Nancy Jackson and/or follow through with the pain clinic evaluation as previously recommended by Dr. Nancy Jackson. Please notify patient that he should be seen in follow-up prior to authorizing any additional refills on his Tramadol. Thanks.      Collin Robin

## 2020-01-31 NOTE — TELEPHONE ENCOUNTER
rcv'd call from [facility] questioning why the prescription was denied. They had requested a refill by fax and the fax was sent back with denied written on it. The pt is at the pharmacy asking for the refill.   Dr Hdez/Camilla romano advise

## 2020-02-05 ENCOUNTER — TELEPHONE (OUTPATIENT)
Dept: ORTHOPEDICS CLINIC | Facility: CLINIC | Age: 65
End: 2020-02-05

## 2020-02-05 NOTE — TELEPHONE ENCOUNTER
Pt states he will no longer need tramadol refill. Pt wanted to say he is very thankful for service offered by clinical staff.

## 2020-02-05 NOTE — TELEPHONE ENCOUNTER
Pt requesting to speak to RN, states he continues to have pain, also wants to discuss tramadol medication and other things, states he will disclose further info to RN. Please call thank you.

## 2020-02-05 NOTE — TELEPHONE ENCOUNTER
Spoke to pt and he is requesting 1 more refill of Tramadol 50 mg. States he now lives 3 hours away in [place]. States he is trying to find a physician in [place] to treat his back. States he cannot travel 3 hours to see [facility] or [facility].  Pt did have MEET

## 2020-02-06 RX ORDER — GABAPENTIN 300 MG/1
CAPSULE ORAL
Qty: 180 CAPSULE | Refills: 1 | Status: SHIPPED | OUTPATIENT
Start: 2020-02-06 | End: 2020-05-26

## 2020-02-06 NOTE — TELEPHONE ENCOUNTER
Refill passed per [facility] protocol.   Refill Protocol Appointment Criteria  · Appointment scheduled in the past 6 months or in the next 3 months  Recent Outpatient Visits            2 months ago Type 2 diabetes mellitus without complication, without

## 2020-02-06 NOTE — TELEPHONE ENCOUNTER
Refill passed per [facility] protocol.   Unable to refill per protocol Allergy to red Dye

## 2020-02-20 DIAGNOSIS — E78.2 MIXED DYSLIPIDEMIA: ICD-10-CM

## 2020-02-20 RX ORDER — FAMOTIDINE 40 MG/1
TABLET, FILM COATED ORAL
Qty: 90 TABLET | Refills: 0 | Status: SHIPPED | OUTPATIENT
Start: 2020-02-20 | End: 2020-05-15

## 2020-02-20 RX ORDER — ATORVASTATIN CALCIUM 80 MG/1
TABLET, FILM COATED ORAL
Qty: 90 TABLET | Refills: 1 | Status: SHIPPED | OUTPATIENT
Start: 2020-02-20

## 2020-02-20 NOTE — TELEPHONE ENCOUNTER
90 day refill      Current Outpatient Medications:     •  FAMOTIDINE 40 MG Oral Tab, TAKE 1 TABLET BY MOUTH AT BEDTIME, Disp: 90 tablet, Rfl: 0

## 2020-02-20 NOTE — TELEPHONE ENCOUNTER
Per pharmacy requesting 90 day supply.       Current Outpatient Medications:   •  ATORVASTATIN 80 MG Oral Tab, TAKE 1 TABLET BY MOUTH EVERY NIGHT AT BEDTIME, Disp: 90 tablet, Rfl: 1

## 2020-02-20 NOTE — TELEPHONE ENCOUNTER
Refill passed per [facility] protocol.   Refill Protocol Appointment Criteria  · Appointment scheduled in the past 6 months or in the next 3 months  Recent Outpatient Visits            3 months ago Type 2 diabetes mellitus without complication, without

## 2020-02-21 DIAGNOSIS — E78.00 PURE HYPERCHOLESTEROLEMIA: ICD-10-CM

## 2020-02-21 RX ORDER — FENOFIBRATE 160 MG/1
TABLET ORAL
Qty: 90 TABLET | Refills: 1 | Status: SHIPPED | OUTPATIENT
Start: 2020-02-21

## 2020-02-21 NOTE — TELEPHONE ENCOUNTER
Refill passed per [facility], [facility] protocol.   Cholesterol Medications  Protocol Criteria:  · Appointment scheduled in the past 12 months or in the next 3 months  · ALT & LDL on file in the past 12 months  · ALT result < 80  · LDL result <130   Recent Outpat

## 2020-02-21 NOTE — TELEPHONE ENCOUNTER
Refill passed per [facility] protocol.   Cholesterol Medications  Protocol Criteria:  · Appointment scheduled in the past 12 months or in the next 3 months  · ALT & LDL on file in the past 12 months  · ALT result < 80  · LDL result <130   Recent Outpat

## 2020-02-21 NOTE — TELEPHONE ENCOUNTER
Script Clarification - Pharm transferred a script here and it only has #30 tabs left on Rx but insurance will only pay for it if it's for 90 days. Can we get a new Rx sent in for 90 days? Thanks much!       Current Outpatient Medications:   •     •  Stacy Florez

## 2020-03-09 NOTE — TELEPHONE ENCOUNTER
Pharm Note:    This medication has been dispensed as a 90 day supply per pt request.  Please provide additional refills if appropriate.       Current Outpatient Medications:     •  GABAPENTIN 300 MG Oral Cap, TAKE 1 CAPSULE BY MOUTH TWICE DAILY, Disp: 180 c

## 2020-03-11 RX ORDER — GABAPENTIN 300 MG/1
CAPSULE ORAL
Qty: 180 CAPSULE | Refills: 1 | OUTPATIENT
Start: 2020-03-11

## 2020-03-16 ENCOUNTER — TELEPHONE (OUTPATIENT)
Dept: GASTROENTEROLOGY | Facility: CLINIC | Age: 65
End: 2020-03-16

## 2020-03-16 ENCOUNTER — MED REC SCAN ONLY (OUTPATIENT)
Dept: GASTROENTEROLOGY | Facility: CLINIC | Age: 65
End: 2020-03-16

## 2020-03-16 NOTE — TELEPHONE ENCOUNTER
Prescriber response form from [facility] completed by Dr. Geovani King and faxed to # [phone].   Confirmation received 3/16/2020 at [facility]

## 2020-04-17 ENCOUNTER — TELEPHONE (OUTPATIENT)
Dept: GASTROENTEROLOGY | Facility: CLINIC | Age: 65
End: 2020-04-17

## 2020-04-17 NOTE — TELEPHONE ENCOUNTER
Completed prescriber response form faxed to [address] [place] [address] at [phone] with return confirmation. Form sent to scan.

## 2020-05-15 RX ORDER — FAMOTIDINE 40 MG/1
TABLET, FILM COATED ORAL
Qty: 90 TABLET | Refills: 0 | Status: SHIPPED | OUTPATIENT
Start: 2020-05-15 | End: 2020-08-18

## 2020-05-26 RX ORDER — GABAPENTIN 300 MG/1
CAPSULE ORAL
Qty: 180 CAPSULE | Refills: 1 | Status: SHIPPED | OUTPATIENT
Start: 2020-05-26

## 2020-06-02 ENCOUNTER — TELEPHONE (OUTPATIENT)
Dept: GASTROENTEROLOGY | Facility: CLINIC | Age: 65
End: 2020-06-02

## 2020-06-02 NOTE — TELEPHONE ENCOUNTER
Dr Sadaf Anne, re below. Last OV 11/13/19. Patient contacted, informed chart says iodine (GNP) allergy. I spoke to Johanny Fry in [place]--he does not know about GNP--I looked it up, appears to be just tincture of iodine.  I also did not see any orders from you for a

## 2020-06-02 NOTE — TELEPHONE ENCOUNTER
He has received IV contrast with CT scans from 2012 and before. If he did not have a reaction, then he is not allergic. I did not order a CT scan.

## 2020-06-03 NOTE — TELEPHONE ENCOUNTER
Patient contacted and message from Dr. Jackie Leigh given. Patient states he remembers breaking out in hives in 1982 after a red dye was injected into his knee. Never from IV contrast.  Patient voiced understanding.

## 2020-06-10 ENCOUNTER — TELEPHONE (OUTPATIENT)
Dept: INTERNAL MEDICINE CLINIC | Facility: CLINIC | Age: 65
End: 2020-06-10

## 2020-06-10 DIAGNOSIS — K21.9 GASTROESOPHAGEAL REFLUX DISEASE WITHOUT ESOPHAGITIS: ICD-10-CM

## 2020-06-10 DIAGNOSIS — N40.1 BENIGN NODULAR PROSTATIC HYPERPLASIA WITH LOWER URINARY TRACT SYMPTOMS: ICD-10-CM

## 2020-06-10 DIAGNOSIS — I10 ESSENTIAL HYPERTENSION: Primary | ICD-10-CM

## 2020-06-10 RX ORDER — FINASTERIDE 5 MG/1
5 TABLET, FILM COATED ORAL DAILY
Qty: 90 TABLET | Refills: 0 | Status: SHIPPED | OUTPATIENT
Start: 2020-06-10

## 2020-06-10 RX ORDER — LOSARTAN POTASSIUM 50 MG/1
50 TABLET ORAL DAILY
Qty: 90 TABLET | Refills: 0 | Status: SHIPPED | OUTPATIENT
Start: 2020-06-10

## 2020-06-10 RX ORDER — TERAZOSIN 5 MG/1
5 CAPSULE ORAL NIGHTLY
Qty: 90 CAPSULE | Refills: 0 | Status: SHIPPED | OUTPATIENT
Start: 2020-06-10

## 2020-06-10 RX ORDER — DICYCLOMINE HYDROCHLORIDE 10 MG/1
10 CAPSULE ORAL 2 TIMES DAILY
Qty: 180 CAPSULE | Refills: 0 | Status: SHIPPED | OUTPATIENT
Start: 2020-06-10

## 2020-06-10 NOTE — TELEPHONE ENCOUNTER
Current Outpatient Medications:   •  ••  finasteride 5 MG Oral Tab, Take 1 tablet (5 mg total) by mouth daily. , Disp: 90 tablet, Rfl: 3    Current Outpatient Medications:   ••  Terazosin HCl 5 MG Oral Cap, Take 1 capsule (5 mg total) by mouth nightly. , D

## 2020-06-11 RX ORDER — PANTOPRAZOLE SODIUM 40 MG/1
TABLET, DELAYED RELEASE ORAL
Qty: 180 TABLET | Refills: 1 | Status: SHIPPED | OUTPATIENT
Start: 2020-06-11

## 2020-06-11 NOTE — TELEPHONE ENCOUNTER
Requested Prescriptions     Pending Prescriptions Disp Refills   • PANTOPRAZOLE SODIUM 40 MG Oral Tab EC [Pharmacy Med Name: PANTOPRAZOLE SOD DR 40 MG TAB] 180 tablet 1     Sig: TAKE 1 TABLET BY MOUTH TWICE DAILY BEFORE MEALS     Lr: 7/29/2019   Lov: 11/13

## 2020-08-11 NOTE — PROGRESS NOTES
HPI:    Patient ID: Elizabeth Conn is a 66year old male.     HPI  Doing great on CPAP with excellent compliance and he cannot sleep without it  CPAP gave him significant improvement in his energy  No residual daytime sleepiness or fatigue  No side effect I would like Mr. Christy to have a transthoracic echo in pre-op tomorrow before his catheterization, thanks.   FASTCLIX LANCETS Does not apply Misc USE TWICE DAILY Disp: 102 each Rfl: 2   Dermatological Products, Misc.  (RESINOL) 55-2 % Apply Externally Ointment Apply to rectal area as directed per MD. Disp: 1 each Rfl: 3   Ascorbic Acid (VITAMIN C) 100 MG Oral Tab using CPAP is normal     Continue with CPAP at 11 CWP  Continue diet and exercise and weight reduction  Avoid driving if sleepy  Avoid sedative narcotic and alcohol      2- smoker   Not interested in talking about smoking cessation or smoking related lung

## 2020-08-18 RX ORDER — FAMOTIDINE 40 MG/1
TABLET, FILM COATED ORAL
Qty: 90 TABLET | Refills: 0 | Status: SHIPPED | OUTPATIENT
Start: 2020-08-18

## 2020-08-22 DIAGNOSIS — E78.2 MIXED DYSLIPIDEMIA: ICD-10-CM

## 2020-08-22 DIAGNOSIS — E78.00 PURE HYPERCHOLESTEROLEMIA: ICD-10-CM

## 2020-08-22 RX ORDER — FENOFIBRATE 160 MG/1
TABLET ORAL
Qty: 90 TABLET | Refills: 1 | OUTPATIENT
Start: 2020-08-22

## 2020-08-22 RX ORDER — ATORVASTATIN CALCIUM 80 MG/1
TABLET, FILM COATED ORAL
Qty: 90 TABLET | Refills: 1 | OUTPATIENT
Start: 2020-08-22

## 2020-08-22 NOTE — TELEPHONE ENCOUNTER
Needs complete physical.  He maybe seeing another PCP now as there are notes in the chart from an outside provider. If this is the case then the refills should be directed to the new provider.

## 2020-08-25 NOTE — TELEPHONE ENCOUNTER
Attempted to reach patient, no answer voicemail left requesting patient to call back and confirm he is still patient of our practice or if he has a new PCP.

## 2020-08-27 NOTE — TELEPHONE ENCOUNTER
Patient returning call, [facility] does have a new PCP at [place], [place]. He states he's been having issues with pharmacy, not sure why the med refill was sent to Dr. Remi Rodriguez but that he will contact pharmacy as he had already informed pharmacy he had a new PCP.  Ines

## 2020-08-28 DIAGNOSIS — N40.1 BENIGN NODULAR PROSTATIC HYPERPLASIA WITH LOWER URINARY TRACT SYMPTOMS: ICD-10-CM

## 2020-08-28 DIAGNOSIS — I10 ESSENTIAL HYPERTENSION: ICD-10-CM

## 2020-08-28 DIAGNOSIS — E78.2 MIXED DYSLIPIDEMIA: ICD-10-CM

## 2020-08-28 DIAGNOSIS — K21.9 GASTROESOPHAGEAL REFLUX DISEASE WITHOUT ESOPHAGITIS: ICD-10-CM

## 2020-08-28 DIAGNOSIS — E78.00 PURE HYPERCHOLESTEROLEMIA: ICD-10-CM

## 2020-08-28 RX ORDER — FENOFIBRATE 160 MG/1
TABLET ORAL
Qty: 90 TABLET | Refills: 1 | OUTPATIENT
Start: 2020-08-28

## 2020-08-28 RX ORDER — LOSARTAN POTASSIUM 50 MG/1
TABLET ORAL
Qty: 90 TABLET | Refills: 0 | OUTPATIENT
Start: 2020-08-28

## 2020-08-28 RX ORDER — DICYCLOMINE HYDROCHLORIDE 10 MG/1
10 CAPSULE ORAL 2 TIMES DAILY
Qty: 180 CAPSULE | Refills: 0 | OUTPATIENT
Start: 2020-08-28

## 2020-08-28 RX ORDER — CETIRIZINE HYDROCHLORIDE 10 MG/1
TABLET ORAL
Qty: 90 TABLET | Refills: 1 | OUTPATIENT
Start: 2020-08-28

## 2020-08-28 RX ORDER — TERAZOSIN 5 MG/1
CAPSULE ORAL
Qty: 90 CAPSULE | Refills: 0 | OUTPATIENT
Start: 2020-08-28

## 2020-08-28 RX ORDER — ATORVASTATIN CALCIUM 80 MG/1
TABLET, FILM COATED ORAL
Qty: 90 TABLET | Refills: 1 | OUTPATIENT
Start: 2020-08-28

## 2020-08-28 RX ORDER — FINASTERIDE 5 MG/1
TABLET, FILM COATED ORAL
Qty: 90 TABLET | Refills: 0 | OUTPATIENT
Start: 2020-08-28

## 2020-08-28 NOTE — TELEPHONE ENCOUNTER
Please see if patient is still seeing me as his primary care provider. If not then call the pharmacy and redirect these refill request to the new provider.

## 2020-08-28 NOTE — TELEPHONE ENCOUNTER
Spoke with pt who states he moved to [place]. Pt states he contacted [facility] and informed them of his recent move and due to that he will no longer be seeing Dr. Seth Villarreal as his PCP. Pt states he'd asked if the pharmacy can stop sending Rx request to Dr. George Laureano office. His current PCP will be taking over his medication refills. Nothing further is needed.

## 2020-08-31 NOTE — TELEPHONE ENCOUNTER
2nd Attempt - Spoke to Patient. He advised he moved to an area over [address] hours [address]. He transferred Drs to an office closer to him.

## 2020-09-02 ENCOUNTER — TELEPHONE (OUTPATIENT)
Dept: GASTROENTEROLOGY | Facility: CLINIC | Age: 65
End: 2020-09-02

## 2020-09-16 RX ORDER — GABAPENTIN 300 MG/1
CAPSULE ORAL
Qty: 180 CAPSULE | Refills: 1 | OUTPATIENT
Start: 2020-09-16

## 2020-09-16 NOTE — TELEPHONE ENCOUNTER
Please see June 11, 2020 refill encounter. The patient has moved 2 hours away from the area and was to establish care with a local gastroenterologist.  If he has done so, I would prefer that his local gastroenterologist refill the prescription. If not please let me know.

## 2020-09-18 RX ORDER — PANTOPRAZOLE SODIUM 40 MG/1
TABLET, DELAYED RELEASE ORAL
Qty: 180 TABLET | Refills: 1 | OUTPATIENT
Start: 2020-09-18

## 2020-09-18 NOTE — TELEPHONE ENCOUNTER
Spoke to pt, he doesn't need the refill. The pharmacy keeps sending it to you by mistake. He has been trying to have the pharmacy cancel it and have his new MDs prescription prescribed to him.  He did states that he does wish you the best and misses your great care and he wishes you all the best in the world

## 2020-10-08 RX ORDER — FAMOTIDINE 40 MG/1
TABLET, FILM COATED ORAL
Qty: 30 TABLET | Refills: 2 | OUTPATIENT
Start: 2020-10-08

## 2020-10-12 NOTE — TELEPHONE ENCOUNTER
90 DAYS SUPPLY :REQUEST FOR AUTHORIZATION      Current Outpatient Medications:   •  FAMOTIDINE 40 MG Oral Tab, TAKE 1 TABLET BY MOUTH EVERYDAY AT BEDTIME, Disp: 90 tablet, Rfl: 0

## 2020-10-12 NOTE — TELEPHONE ENCOUNTER
Contacted [facility] who verified that patient has a new doctor and they accidentally sent it to [address] [place] [address] Drive. no further questions.

## 2020-11-12 RX ORDER — GABAPENTIN 300 MG/1
CAPSULE ORAL
Qty: 180 CAPSULE | Refills: 1 | OUTPATIENT
Start: 2020-11-12

## 2020-11-12 NOTE — TELEPHONE ENCOUNTER
Patient has moved and has a new primary care physician. Please let the pharmacy know to stop sending refill request to this office.

## 2021-01-01 NOTE — TELEPHONE ENCOUNTER
----- Message from Raymundo Burgos MD sent at 1/24/2018  5:36 PM CST -----  I spoke to the patient. He is feeling well. He had #2 subcentimeter tubular adenomas removed. I have discussed the significance.   I have recommended a follow-up colonoscopy initiation of breastfeeding/breast milk feeding

## 2021-03-20 DIAGNOSIS — Z23 NEED FOR VACCINATION: ICD-10-CM

## 2022-04-11 ENCOUNTER — TELEPHONE (OUTPATIENT)
Dept: GASTROENTEROLOGY | Facility: CLINIC | Age: 67
End: 2022-04-11

## 2022-04-11 NOTE — TELEPHONE ENCOUNTER
----- Message from Ghada Shipman RN sent at 4/11/2019  5:58 PM CDT -----  Regarding: Recall EGD in 3 years per Dr. Rubina Cota . EGD done 4/9/19  Recall EGD in 3 years per Dr. Rubina Cota .  EGD done 4/9/19

## 2022-11-25 ENCOUNTER — TELEPHONE (OUTPATIENT)
Facility: CLINIC | Age: 67
End: 2022-11-25

## 2022-11-25 NOTE — TELEPHONE ENCOUNTER
----- Message from Radha Smith, [address] sent at 11/23/2022  8:00 AM CST -----  Regarding: Recall Colon  Bebeto tSein CMA  P Em Gi Clinical Staff  Recall colon in 5 years per GS.  Colon done 1-23-18

## 2022-12-07 ENCOUNTER — TELEPHONE (OUTPATIENT)
Facility: CLINIC | Age: 67
End: 2022-12-07

## 2022-12-07 NOTE — TELEPHONE ENCOUNTER
Pt is requesting last [facility] procedure results to be sent to his new Gi provider [phone] attention Nurse Kylie please follow up

## 2025-06-16 NOTE — TELEPHONE ENCOUNTER
06/16/2025      Dear Mahmoud [facility] is committed to your overall health. Periodically we review the health information in your chart to make sure you are up to date on all of your recommended tests and/or procedures.       Our review of your chart shows that you may be due for     Health Maintenance Due   Topic    Colorectal Cancer Screening        If you have had any of the above done at another facility, please let us know and we will request a copy of the report to update your [facility] record.       At your convenience I would like to speak to you to help get these items scheduled (if needed) and also see if there is anything else we can do to help you. Please send me a message via your patient portal or give me a call at [phone].  I am looking forward to speaking with you soon.     Sincerely,      RYLIE Siddiqi Care Coordinator  Erwin Chung MD and your [facility] Primary Care Team        Estefani Notice would be due for a liver ultrasound and blood work in November 2019. I have placed the orders. Please have the patient make an appointment to be seen in late November or in December.

## 2025-07-03 NOTE — TELEPHONE ENCOUNTER
----- Message from Haider Aguirre MD sent at 6/2/2017  6:27 PM CDT -----  Please inform the patient that his liver ultrasound is stable as compared to previous examination. No suspicious findings. A small gallbladder polyp is stable in size.   The u normal gait and station, no deformities

## (undated) DEVICE — Device: Brand: DEFENDO AIR/WATER/SUCTION AND BIOPSY VALVE

## (undated) DEVICE — RENTAL LASER GREEN LIGHT XPS

## (undated) DEVICE — LINE MNTR ADLT SET O2 INTMD

## (undated) DEVICE — FORCEP RADIAL JAW 4

## (undated) DEVICE — SNARE CAPTIFLEX MICRO-OVL OLY

## (undated) DEVICE — STERILE LATEX POWDER-FREE SURGICAL GLOVESWITH NITRILE COATING: Brand: PROTEXIS

## (undated) DEVICE — Device: Brand: CUSTOM PROCEDURE KIT

## (undated) DEVICE — ENDOSCOPY PACK - LOWER: Brand: MEDLINE INDUSTRIES, INC.

## (undated) DEVICE — PLASTC TOOMEY SYRNG DISP

## (undated) DEVICE — MEDI-VAC NON-CONDUCTIVE SUCTION TUBING 6MM X 1.8M (6FT.) L: Brand: CARDINAL HEALTH

## (undated) DEVICE — UROLOGY DRAIN BAG STERILE

## (undated) DEVICE — 3 ML SYRINGE LUER-LOCK TIP: Brand: MONOJECT

## (undated) DEVICE — STERILE POLYISOPRENE POWDER-FREE SURGICAL GLOVES: Brand: PROTEXIS

## (undated) DEVICE — SNARE OPTMZ PLPCTM TRP

## (undated) DEVICE — MEDI-VAC NON-CONDUCTIVE SUCTION TUBING: Brand: CARDINAL HEALTH

## (undated) DEVICE — SNARE 9MM 230CM 2.4MM EXACTO

## (undated) DEVICE — SOL  .9 1000ML BAG

## (undated) DEVICE — 6 ML SYRINGE LUER-LOCK TIP: Brand: MONOJECT

## (undated) DEVICE — SOL  .9 3000ML

## (undated) DEVICE — CYSTO PACK: Brand: MEDLINE INDUSTRIES, INC.

## (undated) DEVICE — SOL H2O 1000ML BTL

## (undated) DEVICE — SEALER CAP SELF-SEAL 1.6MM

## (undated) DEVICE — FIBER XPS MOXY

## (undated) DEVICE — CONMED SCOPE SAVER BITE BLOCK, 20X27 MM: Brand: SCOPE SAVER

## (undated) NOTE — LETTER
4/11/2022    Dee Raygoza            Dear Dee Frazier,      [facility] records indicate that you are due for an appointment for an EGD or Upper Endoscopy in April 2022, or sometime there after, with Palma Guy MD.    Please call our office to schedule this appointment. Your medical well-being is important to us. If your insurance requires a referral, please call your primary care office to request one.       Thank you,      The Physicians and Staff at [facility]

## (undated) NOTE — MR AVS SNAPSHOT
[place]  [address]  [phone]               Thank you for choosing us for your health care visit with So Rice MD.  We are glad to serve you and happy to provide you with this summa TAKE 1 TABLET(10 MG) BY MOUTH NIGHTLY   Commonly known as:  ZYRTEC           Dicyclomine HCl 10 MG Caps   TAKE 1 CAPSULE BY MOUTH FOUR TIMES DAILY AS NEEDED   Commonly known as:  BENTYL           famotidine 40 MG Tabs   TAKE ONE TABLET BY MOUTH AT BEDTIME

## (undated) NOTE — MR AVS SNAPSHOT
[place]  [address]  [phone]               Thank you for choosing us for your health care visit with Alisha Dubon MD.  We are glad to serve you and happy to provide you with this summary Dicyclomine HCl 10 MG Caps   TAKE 1 CAPSULE BY MOUTH FOUR TIMES DAILY AS NEEDED   Commonly known as:  BENTYL           famotidine 40 MG Tabs   TAKE 1 TABLET BY MOUTH AT BEDTIME   Commonly known as:  PEPCID           Fenofibrate 160 MG Tabs   TAKE 1 TAB medical emergencies, dial 911. Visit [email or web address]. [email or web address] to learn more.            Visit [facility] online at  [email or web address]

## (undated) NOTE — LETTER
11/25/2022    Jas Raygoza            Dear Jas Luther,      Our records indicate that you are due for an appointment for a Colonoscopy with Cedric Engel MD. Our doctors are booking out about 3-5 months for procedures. Please call our office to schedule a phone screening appointment to plan for the procedure(s). Your medical well-being is important to us. If your insurance requires a referral, please call your primary care office to request one.       Thank you,      The Physicians and Staff at [facility]

## (undated) NOTE — MR AVS SNAPSHOT
[facility] [place] [address] [address]  [address] [phone]               Thank you for choosing us for your health care visit with Deisy Mao MD.  We are glad to serve you and happy to provide you with this summary o TAKE 1 CAPSULE BY MOUTH FOUR TIMES DAILY AS NEEDED   Commonly known as:  BENTYL           famotidine 40 MG Tabs   TAKE 1 TABLET BY MOUTH AT BEDTIME   Commonly known as:  PEPCID           Fenofibrate 160 MG Tabs   Take 1 tablet (160 mg total) by mouth once Visit [email or web address] to learn more. Educational Information     Your blood pressure indicates you may be at-risk for Hypertension. Please consider the following Lifestyle Modifications.   Also, please return for a follow-up Blood Pres HOW TO GET STARTED: HOW TO STAY MOTIVATED:   Start activities slowly and build up over time Do what you like   Get your heart pumping – brisk walking, biking, swimming Even 10 minute increments are effective and add up over the week   2 ½ hours per week –

## (undated) NOTE — MR AVS SNAPSHOT
[facility] [place] [address] [address]  [address] [phone]               Thank you for choosing us for your health care visit with Mauro Randolph MD.  We are glad to serve you and happy to provide you with this summary o Enlarged Prostate           Medical Issues Discussed Today     Type 2 diabetes mellitus without complication    Urinary frequency    -  Primary      Instructions and Information about Your Health     None      Allergies as of Mar 21, 2017     Codeine Unkn Take 1 tablet (50 mg total) by mouth daily.    Commonly known as:  COZAAR           MetFORMIN HCl 500 MG Tabs   TAKE 2 TABLETS BY MOUTH TWICE DAILY WITH MEALS   Commonly known as:  GLUCOPHAGE           Omega-3-acid Ethyl Esters 1 g Caps   Take 1 capsule by

## (undated) NOTE — MR AVS SNAPSHOT
[place]  [address]  [phone]               Thank you for choosing us for your health care visit with Nu Hastings MD.  We are glad to serve you and happy to provide you with this summary [facility], [facility]  [address] [facility]    If you are confident that your benefit plan will not require a referral or authorization, such as [place] Medicaid, please feel free to schedule your appointment immediately.  Israel Saldaña TAKE 1 TABLET BY MOUTH EVERY NIGHT AT BEDTIME   Commonly known as:  LIPITOR           Sentrigo CONTOUR MONITOR w/Device Kit           CENTRUM SILVER ULTRA MENS Tabs   Take  by mouth.            cetirizine 10 MG Tabs   TAKE 1 TABLET(10 MG) BY MOUTH NIGHTLY   Co OCCULT BLOOD neg Negative    PH, URINE 7.0 4.5 - 8.0    PROTEIN (URINE DIPSTICK) neg Negative/Trace mg/dL    UROBILINOGEN,SEMI-QN 0.2 0.0 - 1.9 mg/dL    NITRITE, URINE neg Negative    LEUKOCYTES neg Negative    APPEARANCE clear Clear    URINE-COLOR yellow

## (undated) NOTE — MR AVS SNAPSHOT
[place]  [address]  [phone]               Thank you for choosing us for your health care visit with Pam Aggarwal MD.  We are glad to serve you and happy to provide you with this summary Take  by mouth. atorvastatin 80 MG Tabs   TAKE 1 TABLET BY MOUTH EVERY NIGHT AT BEDTIME   Commonly known as:  LIPITOR           [employer] CONTOUR MONITOR w/Device Kit           CENTRUM SILVER ULTRA MENS Tabs   Take  by mouth.            cetirizine 10 Support Staff. Remember, [employer] is NOT to be used for urgent needs. For medical emergencies, dial 911. Visit [email or web address]. [facility]. org to learn more.            Visit [facility] online at  [email or web address]

## (undated) NOTE — LETTER
09/03/19        Dana Morris 62      Dear Marck Lai,    [facility] records indicate that you have outstanding lab work and or testing that was ordered for you and has not yet been completed:  Orders Placed This Encounte

## (undated) NOTE — LETTER
7/29/2019              [place] [id number]    Dear Ollie Crenshaw,    As we reminded you on the phone today you are due for a liver ultrasound and blood work in November 2019.  Along with an office visit th